# Patient Record
Sex: FEMALE | Race: BLACK OR AFRICAN AMERICAN | NOT HISPANIC OR LATINO | Employment: FULL TIME | URBAN - METROPOLITAN AREA
[De-identification: names, ages, dates, MRNs, and addresses within clinical notes are randomized per-mention and may not be internally consistent; named-entity substitution may affect disease eponyms.]

---

## 2017-10-21 ENCOUNTER — APPOINTMENT (OUTPATIENT)
Dept: GENERAL RADIOLOGY | Facility: CLINIC | Age: 57
End: 2017-10-21
Attending: EMERGENCY MEDICINE
Payer: COMMERCIAL

## 2017-10-21 ENCOUNTER — HOSPITAL ENCOUNTER (EMERGENCY)
Facility: CLINIC | Age: 57
Discharge: HOME | End: 2017-10-21
Attending: EMERGENCY MEDICINE | Admitting: EMERGENCY MEDICINE
Payer: COMMERCIAL

## 2017-10-21 VITALS
HEIGHT: 61 IN | WEIGHT: 148 LBS | BODY MASS INDEX: 27.94 KG/M2 | SYSTOLIC BLOOD PRESSURE: 153 MMHG | DIASTOLIC BLOOD PRESSURE: 112 MMHG | HEART RATE: 89 BPM | OXYGEN SATURATION: 98 % | TEMPERATURE: 99.1 F | RESPIRATION RATE: 16 BRPM

## 2017-10-21 DIAGNOSIS — T59.811A SMOKE INHALATION: ICD-10-CM

## 2017-10-21 LAB
ANION GAP SERPL CALCULATED.3IONS-SCNC: 7 MMOL/L (ref 3–14)
BASE EXCESS BLDV CALC-SCNC: 1.8 MMOL/L
BASOPHILS # BLD AUTO: 0 10E9/L (ref 0–0.2)
BASOPHILS NFR BLD AUTO: 0.5 %
BUN SERPL-MCNC: 21 MG/DL (ref 7–30)
CALCIUM SERPL-MCNC: 9.2 MG/DL (ref 8.5–10.1)
CHLORIDE SERPL-SCNC: 106 MMOL/L (ref 94–109)
CO2 SERPL-SCNC: 26 MMOL/L (ref 20–32)
COHGB MFR BLD: 1.5 % (ref 0–2)
CREAT SERPL-MCNC: 0.6 MG/DL (ref 0.52–1.04)
DIFFERENTIAL METHOD BLD: ABNORMAL
EOSINOPHIL # BLD AUTO: 0.1 10E9/L (ref 0–0.7)
EOSINOPHIL NFR BLD AUTO: 1.7 %
ERYTHROCYTE [DISTWIDTH] IN BLOOD BY AUTOMATED COUNT: 15.6 % (ref 10–15)
GFR SERPL CREATININE-BSD FRML MDRD: >90 ML/MIN/1.7M2
GLUCOSE SERPL-MCNC: 96 MG/DL (ref 70–99)
HCO3 BLDV-SCNC: 27 MMOL/L (ref 21–28)
HCT VFR BLD AUTO: 37.3 % (ref 35–47)
HGB BLD-MCNC: 12.2 G/DL (ref 11.7–15.7)
IMM GRANULOCYTES # BLD: 0 10E9/L (ref 0–0.4)
IMM GRANULOCYTES NFR BLD: 0 %
INTERPRETATION ECG - MUSE: NORMAL
LYMPHOCYTES # BLD AUTO: 1.6 10E9/L (ref 0.8–5.3)
LYMPHOCYTES NFR BLD AUTO: 38.2 %
MCH RBC QN AUTO: 28.6 PG (ref 26.5–33)
MCHC RBC AUTO-ENTMCNC: 32.7 G/DL (ref 31.5–36.5)
MCV RBC AUTO: 88 FL (ref 78–100)
MONOCYTES # BLD AUTO: 0.5 10E9/L (ref 0–1.3)
MONOCYTES NFR BLD AUTO: 12.8 %
NEUTROPHILS # BLD AUTO: 1.9 10E9/L (ref 1.6–8.3)
NEUTROPHILS NFR BLD AUTO: 46.8 %
NRBC # BLD AUTO: 0 10*3/UL
NRBC BLD AUTO-RTO: 0 /100
OXYHGB MFR BLDV: 82 %
PCO2 BLDV: 43 MM HG (ref 40–50)
PH BLDV: 7.4 PH (ref 7.32–7.43)
PLATELET # BLD AUTO: 205 10E9/L (ref 150–450)
PO2 BLDV: 48 MM HG (ref 25–47)
POTASSIUM SERPL-SCNC: 3.3 MMOL/L (ref 3.4–5.3)
RBC # BLD AUTO: 4.26 10E12/L (ref 3.8–5.2)
SODIUM SERPL-SCNC: 139 MMOL/L (ref 133–144)
WBC # BLD AUTO: 4.1 10E9/L (ref 4–11)

## 2017-10-21 PROCEDURE — 80048 BASIC METABOLIC PNL TOTAL CA: CPT | Performed by: EMERGENCY MEDICINE

## 2017-10-21 PROCEDURE — 85025 COMPLETE CBC W/AUTO DIFF WBC: CPT | Performed by: EMERGENCY MEDICINE

## 2017-10-21 PROCEDURE — 93005 ELECTROCARDIOGRAM TRACING: CPT

## 2017-10-21 PROCEDURE — 82375 ASSAY CARBOXYHB QUANT: CPT | Performed by: EMERGENCY MEDICINE

## 2017-10-21 PROCEDURE — 71020 XR CHEST 2 VW: CPT

## 2017-10-21 PROCEDURE — 99285 EMERGENCY DEPT VISIT HI MDM: CPT | Mod: 25

## 2017-10-21 PROCEDURE — 82805 BLOOD GASES W/O2 SATURATION: CPT | Performed by: EMERGENCY MEDICINE

## 2017-10-21 RX ORDER — MORPHINE SULFATE 2 MG/ML
4 INJECTION, SOLUTION INTRAMUSCULAR; INTRAVENOUS
Status: DISCONTINUED | OUTPATIENT
Start: 2017-10-21 | End: 2017-10-21 | Stop reason: HOSPADM

## 2017-10-21 RX ORDER — ACETAMINOPHEN AND CODEINE PHOSPHATE 300; 30 MG/1; MG/1
1-2 TABLET ORAL EVERY 6 HOURS PRN
Qty: 20 TABLET | Refills: 0 | Status: ON HOLD | OUTPATIENT
Start: 2017-10-21 | End: 2018-11-06

## 2017-10-21 RX ORDER — ALBUTEROL SULFATE 90 UG/1
1-2 AEROSOL, METERED RESPIRATORY (INHALATION) EVERY 6 HOURS PRN
Qty: 1 INHALER | Refills: 0 | Status: SHIPPED | OUTPATIENT
Start: 2017-10-21

## 2017-10-21 ASSESSMENT — ENCOUNTER SYMPTOMS
SHORTNESS OF BREATH: 1
TROUBLE SWALLOWING: 1
ABDOMINAL PAIN: 0
COUGH: 1
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
## 2017-10-21 NOTE — ED AVS SNAPSHOT
Emergency Department    [address] [place] [address]    Phone:  [phone]    Fax:  [phone]                                       Esmer Churchill   MRN: [id number]    Department:   Emergency Department   Date of Visit:  10/21/2017           After Visit Summary Signature Page     I have received my discharge instructions, and my questions have been answered. I have discussed any challenges I see with this plan with the nurse or doctor.    ..........................................................................................................................................  Patient/Patient Representative Signature      ..........................................................................................................................................  Patient Representative Print Name and Relationship to Patient    ..................................................               ................................................  Date                                            Time    ..........................................................................................................................................  Reviewed by Signature/Title    ...................................................              ..............................................  Date                                                            Time
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
## 2018-11-05 ENCOUNTER — APPOINTMENT (OUTPATIENT)
Dept: GENERAL RADIOLOGY | Facility: CLINIC | Age: 58
End: 2018-11-05
Attending: EMERGENCY MEDICINE
Payer: COMMERCIAL

## 2018-11-05 ENCOUNTER — HOSPITAL ENCOUNTER (OUTPATIENT)
Facility: CLINIC | Age: 58
Setting detail: OBSERVATION
Discharge: HOME | End: 2018-11-06
Attending: EMERGENCY MEDICINE | Admitting: HOSPITALIST
Payer: COMMERCIAL

## 2018-11-05 DIAGNOSIS — R07.9 CHEST PAIN: ICD-10-CM

## 2018-11-05 DIAGNOSIS — K21.9 GASTROESOPHAGEAL REFLUX DISEASE, ESOPHAGITIS PRESENCE NOT SPECIFIED: Primary | ICD-10-CM

## 2018-11-05 LAB
ANION GAP SERPL CALCULATED.3IONS-SCNC: 4 MMOL/L (ref 3–14)
BASOPHILS # BLD AUTO: 0 10E9/L (ref 0–0.2)
BASOPHILS NFR BLD AUTO: 0.5 %
BUN SERPL-MCNC: 21 MG/DL (ref 7–30)
CALCIUM SERPL-MCNC: 9.4 MG/DL (ref 8.5–10.1)
CHLORIDE SERPL-SCNC: 108 MMOL/L (ref 94–109)
CO2 SERPL-SCNC: 28 MMOL/L (ref 20–32)
CREAT SERPL-MCNC: 0.62 MG/DL (ref 0.52–1.04)
D DIMER PPP FEU-MCNC: <0.3 UG/ML FEU (ref 0–0.5)
DIFFERENTIAL METHOD BLD: ABNORMAL
EOSINOPHIL # BLD AUTO: 0.1 10E9/L (ref 0–0.7)
EOSINOPHIL NFR BLD AUTO: 2 %
ERYTHROCYTE [DISTWIDTH] IN BLOOD BY AUTOMATED COUNT: 15.8 % (ref 10–15)
GFR SERPL CREATININE-BSD FRML MDRD: >90 ML/MIN/1.7M2
GLUCOSE SERPL-MCNC: 73 MG/DL (ref 70–99)
HCT VFR BLD AUTO: 40.9 % (ref 35–47)
HGB BLD-MCNC: 13.4 G/DL (ref 11.7–15.7)
IMM GRANULOCYTES # BLD: 0 10E9/L (ref 0–0.4)
IMM GRANULOCYTES NFR BLD: 0.3 %
INTERPRETATION ECG - MUSE: NORMAL
LYMPHOCYTES # BLD AUTO: 2.1 10E9/L (ref 0.8–5.3)
LYMPHOCYTES NFR BLD AUTO: 52.8 %
MCH RBC QN AUTO: 29 PG (ref 26.5–33)
MCHC RBC AUTO-ENTMCNC: 32.8 G/DL (ref 31.5–36.5)
MCV RBC AUTO: 89 FL (ref 78–100)
MONOCYTES # BLD AUTO: 0.4 10E9/L (ref 0–1.3)
MONOCYTES NFR BLD AUTO: 10.8 %
NEUTROPHILS # BLD AUTO: 1.3 10E9/L (ref 1.6–8.3)
NEUTROPHILS NFR BLD AUTO: 33.6 %
NRBC # BLD AUTO: 0 10*3/UL
NRBC BLD AUTO-RTO: 0 /100
PLATELET # BLD AUTO: 223 10E9/L (ref 150–450)
POTASSIUM SERPL-SCNC: 3.9 MMOL/L (ref 3.4–5.3)
RBC # BLD AUTO: 4.62 10E12/L (ref 3.8–5.2)
SODIUM SERPL-SCNC: 140 MMOL/L (ref 133–144)
TROPONIN I SERPL-MCNC: <0.015 UG/L (ref 0–0.04)
WBC # BLD AUTO: 4 10E9/L (ref 4–11)

## 2018-11-05 PROCEDURE — 25000125 ZZHC RX 250: Performed by: EMERGENCY MEDICINE

## 2018-11-05 PROCEDURE — 85379 FIBRIN DEGRADATION QUANT: CPT | Performed by: EMERGENCY MEDICINE

## 2018-11-05 PROCEDURE — 93005 ELECTROCARDIOGRAM TRACING: CPT

## 2018-11-05 PROCEDURE — 99217 ZZC OBSERVATION CARE DISCHARGE: CPT | Performed by: PHYSICIAN ASSISTANT

## 2018-11-05 PROCEDURE — 83036 HEMOGLOBIN GLYCOSYLATED A1C: CPT | Performed by: EMERGENCY MEDICINE

## 2018-11-05 PROCEDURE — 99285 EMERGENCY DEPT VISIT HI MDM: CPT | Mod: 25

## 2018-11-05 PROCEDURE — 71046 X-RAY EXAM CHEST 2 VIEWS: CPT

## 2018-11-05 PROCEDURE — 84484 ASSAY OF TROPONIN QUANT: CPT | Performed by: EMERGENCY MEDICINE

## 2018-11-05 PROCEDURE — 99220 ZZC INITIAL OBSERVATION CARE,LEVL III: CPT | Performed by: HOSPITALIST

## 2018-11-05 PROCEDURE — 85025 COMPLETE CBC W/AUTO DIFF WBC: CPT | Performed by: EMERGENCY MEDICINE

## 2018-11-05 PROCEDURE — 25000132 ZZH RX MED GY IP 250 OP 250 PS 637: Performed by: EMERGENCY MEDICINE

## 2018-11-05 PROCEDURE — 80048 BASIC METABOLIC PNL TOTAL CA: CPT | Performed by: EMERGENCY MEDICINE

## 2018-11-05 RX ORDER — NITROGLYCERIN 0.4 MG/1
0.4 TABLET SUBLINGUAL EVERY 5 MIN PRN
Status: COMPLETED | OUTPATIENT
Start: 2018-11-05 | End: 2018-11-05

## 2018-11-05 RX ORDER — ASPIRIN 81 MG/1
324 TABLET, CHEWABLE ORAL ONCE
Status: COMPLETED | OUTPATIENT
Start: 2018-11-05 | End: 2018-11-05

## 2018-11-05 RX ADMIN — NITROGLYCERIN 0.4 MG: 0.4 TABLET SUBLINGUAL at 21:04

## 2018-11-05 RX ADMIN — NITROGLYCERIN 0.4 MG: 0.4 TABLET SUBLINGUAL at 23:10

## 2018-11-05 RX ADMIN — LIDOCAINE HYDROCHLORIDE 30 ML: 20 SOLUTION ORAL; TOPICAL at 21:36

## 2018-11-05 RX ADMIN — NITROGLYCERIN 0.4 MG: 0.4 TABLET SUBLINGUAL at 23:17

## 2018-11-05 RX ADMIN — ASPIRIN 81 MG 324 MG: 81 TABLET ORAL at 21:03

## 2018-11-05 ASSESSMENT — ENCOUNTER SYMPTOMS
NAUSEA: 0
SHORTNESS OF BREATH: 1
FEVER: 0
DIAPHORESIS: 0
COUGH: 0
VOMITING: 0

## 2018-11-05 NOTE — IP AVS SNAPSHOT
[facility]    [address] [place] [address]    Phone:  [phone]                                       After Visit Summary   11/5/2018    Esmer Churchill    MRN: [id number]           After Visit Summary Signature Page     I have received my discharge instructions, and my questions have been answered. I have discussed any challenges I see with this plan with the nurse or doctor.    ..........................................................................................................................................  Patient/Patient Representative Signature      ..........................................................................................................................................  Patient Representative Print Name and Relationship to Patient    ..................................................               ................................................  Date                                   Time    ..........................................................................................................................................  Reviewed by Signature/Title    ...................................................              ..............................................  Date                                               Time          22EPIC Rev 08/18

## 2018-11-05 NOTE — IP AVS SNAPSHOT
MRN:[id number]                      After Visit Summary   11/5/2018    Esmer Churchill    MRN: [id number]           Thank you!     Thank you for choosing [facility] for your care. Our goal is always to provide you with excellent care. Hearing back from our patients is one way we can continue to improve our services. Please take a few minutes to complete the written survey that you may receive in the mail after you visit with us. Thank you!        Patient Information     Date Of Birth          1960        About your hospital stay     You were admitted on:  November 6, 2018 [facility]    You were discharged on:  November 6, 2018        Reason for your hospital stay       Observation hospitalization for evaluation of chest pain.  Your cardiac work up including your stress test was normal.                  Who to Call     For medical emergencies, please call 911.  For non-urgent questions about your medical care, please call your primary care provider or clinic, [phone]          Attending Provider     Provider Specialty    Tabatha White MD Emergency Medicine    [facility]       Primary Care Provider Office Phone # Fax #    [facility] [phone]      After Care Instructions     Activity       Your activity upon discharge: activity as tolerated.            Diet       Follow this diet upon discharge: Regular, Avoid foods with high acidity or that are spicy.  Limit caffeine and coffee intake.                  Follow-up Appointments     Follow-up and recommended labs and tests        Follow up with primary care provider within 7 days for hospital follow- up and for further workup of non-cardiac causes of chest pain.  Consider gastroenterology referral if pain persists.                  Pending Results     No orders found for last 3 day(s).            Statement of Approval      "Ordered          18 1055  I have reviewed and agree with all the recommendations and orders detailed in this document.  EFFECTIVE NOW     Approved and electronically signed by:  Terrance Lopez PA             Admission Information     Date & Time Provider Department Dept. Phone    2018 Ramirez Arevalo MD [facility] Observation Unit [phone]      Your Vitals Were     Blood Pressure Pulse Temperature Respirations Height Weight    99/63 (BP Location: Left arm) 75 96.5  F (35.8  C) (Oral) 14 1.549 m (5' 1\") 72.7 kg (160 lb 3.2 oz)    Pulse Oximetry BMI (Body Mass Index)                99% 30.27 kg/m2          MyChart Information     [email or web address] lets you send messages to your doctor, view your test results, renew your prescriptions, schedule appointments and more. To sign up, go to [email or web address] . Click on \"Log in\" on the left side of the screen, which will take you to the Welcome page. Then click on \"Sign up Now\" on the right side of the page.     You will be asked to enter the access code listed below, as well as some personal information. Please follow the directions to create your username and password.     Your access code is: [email or web address]  Expires: 2019 10:57 AM     Your access code will  in 90 days. If you need help or a new code, please call your [facility] clinic or [phone].        Care EveryWhere ID     This is your Care EveryWhere ID. This could be used by other organizations to access your [facility] medical records  [id number]        Equal Access to Services     [facility]: Hadii benedict darnell Somilad, waaxda luqadaha, qaybta kaalmalala bernal . So [facility] [phone].    ATENCIÓN: Si habla español, tiene a norman disposición servicios gratuitos de asistencia lingüística. Llame al [phone].    We comply with applicable federal civil rights laws and [place] laws. We do not discriminate on the basis of race, color, " national origin, age, disability, sex, sexual orientation, or gender identity.               Review of your medicines      START taking        Dose / Directions    calcium carbonate 500 MG chewable tablet   Commonly known as:  TUMS   Used for:  Gastroesophageal reflux disease, esophagitis presence not specified        Dose:  1-2 chew tab   Take 1-2 tablets (500-1,000 mg) by mouth 3 times daily as needed for heartburn   Refills:  0       ranitidine 75 MG tablet   Commonly known as:  ZANTAC   Used for:  Gastroesophageal reflux disease, esophagitis presence not specified        Dose:  150 mg   Take 2 tablets (150 mg) by mouth 2 times daily as needed for heartburn   Refills:  0         CONTINUE these medicines which may have CHANGED, or have new prescriptions. If we are uncertain of the size of tablets/capsules you have at home, strength may be listed as something that might have changed.        Dose / Directions    omeprazole 20 MG CR capsule   Commonly known as:  priLOSEC   This may have changed:    - how much to take  - when to take this   Used for:  Gastroesophageal reflux disease, esophagitis presence not specified        Dose:  40 mg   Take 2 capsules (40 mg) by mouth   Refills:  0         CONTINUE these medicines which have NOT CHANGED        Dose / Directions    albuterol 108 (90 Base) MCG/ACT inhaler   Commonly known as:  PROAIR HFA/PROVENTIL HFA/VENTOLIN HFA        Dose:  1-2 puff   Inhale 1-2 puffs into the lungs every 6 hours as needed for shortness of breath / dyspnea or wheezing   Quantity:  1 Inhaler   Refills:  0       hydrochlorothiazide 12.5 MG Tabs tablet        Dose:  12.5 mg   Take 12.5 mg by mouth daily   Refills:  0       lisinopril 40 MG tablet   Commonly known as:  PRINIVIL/ZESTRIL        Dose:  40 mg   Take 40 mg by mouth daily   Refills:  0       multivitamin, therapeutic with minerals Tabs tablet        Dose:  1 tablet   Take 1 tablet by mouth daily   Refills:  0       order for DME   Used  for:  Leg weakness, bilateral        Equipment being ordered: Walker Wheels () and Walker () Treatment Diagnosis: Impaired gait and balance   Quantity:  1 each   Refills:  0       oxybutynin chloride 15 MG Tb24        Dose:  15 mg   Take 15 mg by mouth daily   Refills:  0         STOP taking     ibuprofen 800 MG tablet   Commonly known as:  ADVIL/MOTRIN                Where to get your medicines      Some of these will need a paper prescription and others can be bought over the counter. Ask your nurse if you have questions.     You don't need a prescription for these medications     calcium carbonate 500 MG chewable tablet    ranitidine 75 MG tablet                Protect others around you: Learn how to safely use, store and throw away your medicines at www.disposemymeds.org.             Medication List: This is a list of all your medications and when to take them. Check marks below indicate your daily home schedule. Keep this list as a reference.      Medications           Morning Afternoon Evening Bedtime As Needed    albuterol 108 (90 Base) MCG/ACT inhaler   Commonly known as:  PROAIR HFA/PROVENTIL HFA/VENTOLIN HFA   Inhale 1-2 puffs into the lungs every 6 hours as needed for shortness of breath / dyspnea or wheezing                                calcium carbonate 500 MG chewable tablet   Commonly known as:  TUMS   Take 1-2 tablets (500-1,000 mg) by mouth 3 times daily as needed for heartburn                                hydrochlorothiazide 12.5 MG Tabs tablet   Take 12.5 mg by mouth daily                                lisinopril 40 MG tablet   Commonly known as:  PRINIVIL/ZESTRIL   Take 40 mg by mouth daily                                multivitamin, therapeutic with minerals Tabs tablet   Take 1 tablet by mouth daily                                omeprazole 20 MG CR capsule   Commonly known as:  priLOSEC   Take 2 capsules (40 mg) by mouth                                order for DME   Equipment  being ordered: Walker Wheels () and Walker () Treatment Diagnosis: Impaired gait and balance                                oxybutynin chloride 15 MG Tb24   Take 15 mg by mouth daily                                ranitidine 75 MG tablet   Commonly known as:  ZANTAC   Take 2 tablets (150 mg) by mouth 2 times daily as needed for heartburn                                          More Information        Medicines for GERD  Gastroesophageal reflux disease (GERD) can be treated with medicine. This may be done with a medicine you can buy over the counter. Or it may be done with a medicine that your healthcare provider has to prescribe. In some cases, both types may be used. Your provider will tell you what is best for your symptoms.  Antacids  Antacids work to weaken the acid in your stomach and can give you quick relief. You can buy many of them with no prescription. Antacids can be high in sodium. This may be a problem if you have high blood pressure. Some antacids also have aluminum. This should be avoided if you have long-term (chronic) kidney disease. So check with your provider first. Take antacids only when you need to, as advised by your provider.  Side effects: Constipation, diarrhea. If you take too much medicine, it can cause calcium to build up.   H-2 blockers  H-2 blockers cause the stomach to make less acid. They are often used both on demand as symptoms occur, and daily to keep symptoms away. Your provider may prescribe them if antacids don t work for you. You can buy some of them over the counter. These come in a lower dosage.  Side effects: Confusion in older adults  Proton-pump inhibitors  These also cause the stomach to make less acid. They reduce stomach acid more than H-2 blockers. They may be used for a short time, or longer to treat certain conditions. You can buy some of them over the counter. Or your provider may prescribe them. They help control GERD symptoms.  Side effects: Belly  (abdominal) pain, diarrhea, upset stomach (nausea). Possible other side effects linked to long-term use and high doses.  Prokinetics  These medicines affect the movement of the digestive tract. They may be recommended if your stomach is emptying too slowly. But in most cases they are not recommended for treating GERD.   Side effects: Tiredness, depression, anxiety, problems with physical movement, belly cramps, constipation, diarrhea, a jittery feeling  Medicines to avoid  Don t take aspirin without your provider s approval. And don t take a nonsteroidal anti-inflammatory drug (NSAID), such as ibuprofen. These reduce the protective lining of your stomach. This can lead to more GERD symptoms. Check with your provider or pharmacist before taking a new medicine.   Date Last Reviewed: 7/1/2016 2000-2018 [employer]. [address] [place] [address], [place] [address]. All rights reserved. This information is not intended as a substitute for professional medical care. Always follow your healthcare professional's instructions.                Lifestyle Changes for Controlling GERD  When you have GERD, stomach acid feels as if it s backing up toward your mouth. Whether or not you take medicine to control your GERD, your symptoms can often be improved with lifestyle changes. Talk to your healthcare provider about the following suggestions. These suggestions may help you get relief from your symptoms.      Raise your head  Reflux is more likely to strike when you re lying down flat, because stomach fluid can flow backward more easily. Raising the head of your bed 4 to 6 inches can help. To do this:    Slide blocks or books under the legs at the head of your bed. Or, place a wedge under the mattress. [employer] can make a suitable wedge for you. The wedge should run from your waist to the top of your head.    Don t just prop your head on several pillows. This increases pressure on your stomach. It can make GERD  worse.  Watch your eating habits  Certain foods may increase the acid in your stomach or relax the lower esophageal sphincter. This makes GERD more likely. It s best to avoid the following if they cause you symptoms:    Coffee, tea, and carbonated drinks (with and without caffeine)    Fatty, fried, or spicy food    Mint, chocolate, onions, and tomatoes    Peppermint    Any other foods that seem to irritate your stomach or cause you pain  Relieve the pressure  Tips include the following:    Eat smaller meals, even if you have to eat more often.    Don t lie down right after you eat. Wait a few hours for your stomach to empty.    Avoid tight belts and tight-fitting clothes.    Lose excess weight.  Tobacco and alcohol  Avoid smoking tobacco and drinking alcohol. They can make GERD symptoms worse.  Date Last Reviewed: 7/1/2016 2000-2018 [employer] [address] [place] [address]. All rights reserved. This information is not intended as a substitute for professional medical care. Always follow your healthcare professional's instructions.                GERD (Adult)    The esophagus is a tube that carries food from the mouth to the stomach. A valve (the LES, lower esophageal sphincter) at the lower end of the esophagus prevents stomach acid from flowing upward. When this valve doesn't work properly, stomach contents may repeatedly flow back up (reflux) into the esophagus. This is called gastroesophageal reflux disease (GERD). GERD can irritate the esophagus. It can cause problems with pain, swallowing or breathing. In severe cases, GERD can cause recurrent pneumonia (from aspiration or breathing in particles) or other serious problems.  Symptoms of reflux include burning, pressure or sharp pain in the upper abdomen or mid to lower chest. The pain can spread to the neck, back, or shoulder. There may be belching, an acid taste in the back of the throat, chronic cough, or sore throat, or  "hoarseness. GERD symptoms often occur during the day after a big meal. They can also occur at night when lying down.   Home care  Lifestyle changes can help reduce symptoms. If needed, your healthcare provider may prescribe medicines. Symptoms often improve with treatment, but if treatment is stopped, the symptoms often return after a few months. So most persons with GERD will need to continue treatment or get treatment on and off.  Lifestyle changes    Limit or avoid fatty, fried, and spicy foods, as well as coffee, chocolate, mint, and foods with high acid content such as tomatoes and citrus fruit and juices (orange, grapefruit, lemon).    Don t eat large meals, especially at night. Frequent, smaller meals are best. Don't lie down right after eating. And don t eat anything 3 hours before going to bed.    Don't drink alcohol or smoke. As much as possible, stay away from second hand smoke.    If you are overweight, losing weight will reduce symptoms.     Don't wear tight clothing around your stomach area.    If your symptoms occur during sleep, use a foam wedge to elevate your upper body (not just your head.) Or, place 4\" blocks under the head of your bed. Or use 2 bed risers under your bedframe.  Medicines  If needed, medicines can help relieve the symptoms of GERD and prevent damage to the esophagus. Discuss a medicine plan with your healthcare provider. This may include one or more of the following medicines:    Antacids to help neutralize the normal acids in your stomach.    Acid blockers (Histamine or H2 blockers) to decrease acid production.    Acid inhibitors (proton pump inhibitors PPIs) to decrease acid production in a different way than the blockers. They may work better, but can take a little longer to take effect.  Take an antacid 30 to 60 minutes after eating and at bedtime, but not at the same time as an acid blocker.  Try not to take medicines such as ibuprofen and aspirin. If you are taking aspirin " for your heart or other medical reasons, talk to your healthcare provider about stopping it.  Follow-up care  Follow up with your healthcare provider or as advised by our staff.  When to seek medical advice  Call your healthcare provider if any of the following occur:    Stomach pain gets worse or moves to the lower right abdomen (appendix area)    Chest pain appears or gets worse, or spreads to the back, neck, shoulder, or arm    An over-the-counter trial of medicine doesn't relieve your symptoms    Weight loss that can't be explained    Trouble or pain swallowing    Frequent vomiting (can t keep down liquids)    Blood in the stool or vomit (red or black in color)    Feeling weak or dizzy    Fever of 100.4 F (38 C) or higher, or as directed by your healthcare provider  Date Last Reviewed: 3/1/2018    8751-7481 [employer]. [address] [place] [address], [place] [address]. All rights reserved. This information is not intended as a substitute for professional medical care. Always follow your healthcare professional's instructions.

## 2018-11-06 ENCOUNTER — APPOINTMENT (OUTPATIENT)
Dept: CARDIOLOGY | Facility: CLINIC | Age: 58
End: 2018-11-06
Attending: HOSPITALIST
Payer: COMMERCIAL

## 2018-11-06 VITALS
WEIGHT: 160.2 LBS | SYSTOLIC BLOOD PRESSURE: 99 MMHG | TEMPERATURE: 96.5 F | HEART RATE: 75 BPM | BODY MASS INDEX: 30.25 KG/M2 | OXYGEN SATURATION: 99 % | HEIGHT: 61 IN | RESPIRATION RATE: 14 BRPM | DIASTOLIC BLOOD PRESSURE: 63 MMHG

## 2018-11-06 LAB
GLUCOSE BLDC GLUCOMTR-MCNC: 76 MG/DL (ref 70–99)
GLUCOSE BLDC GLUCOMTR-MCNC: 84 MG/DL (ref 70–99)
GLUCOSE BLDC GLUCOMTR-MCNC: 99 MG/DL (ref 70–99)
HBA1C MFR BLD: 6 % (ref 0–5.6)
HBA1C MFR BLD: 6 % (ref 0–5.6)
TROPONIN I SERPL-MCNC: <0.015 UG/L (ref 0–0.04)

## 2018-11-06 PROCEDURE — 93325 DOPPLER ECHO COLOR FLOW MAPG: CPT | Mod: 26 | Performed by: INTERNAL MEDICINE

## 2018-11-06 PROCEDURE — 93321 DOPPLER ECHO F-UP/LMTD STD: CPT | Mod: 26 | Performed by: INTERNAL MEDICINE

## 2018-11-06 PROCEDURE — 93325 DOPPLER ECHO COLOR FLOW MAPG: CPT | Mod: TC

## 2018-11-06 PROCEDURE — 25500064 ZZH RX 255 OP 636: Performed by: HOSPITALIST

## 2018-11-06 PROCEDURE — 36415 COLL VENOUS BLD VENIPUNCTURE: CPT | Performed by: HOSPITALIST

## 2018-11-06 PROCEDURE — 84484 ASSAY OF TROPONIN QUANT: CPT | Performed by: HOSPITALIST

## 2018-11-06 PROCEDURE — 00000146 ZZHCL STATISTIC GLUCOSE BY METER IP

## 2018-11-06 PROCEDURE — 93018 CV STRESS TEST I&R ONLY: CPT | Performed by: INTERNAL MEDICINE

## 2018-11-06 PROCEDURE — G0378 HOSPITAL OBSERVATION PER HR: HCPCS

## 2018-11-06 PROCEDURE — 93016 CV STRESS TEST SUPVJ ONLY: CPT | Performed by: INTERNAL MEDICINE

## 2018-11-06 PROCEDURE — 93350 STRESS TTE ONLY: CPT | Mod: 26 | Performed by: INTERNAL MEDICINE

## 2018-11-06 PROCEDURE — 25000132 ZZH RX MED GY IP 250 OP 250 PS 637: Performed by: HOSPITALIST

## 2018-11-06 PROCEDURE — 83036 HEMOGLOBIN GLYCOSYLATED A1C: CPT | Performed by: HOSPITALIST

## 2018-11-06 RX ORDER — ACETAMINOPHEN 325 MG/1
650 TABLET ORAL EVERY 4 HOURS PRN
Status: DISCONTINUED | OUTPATIENT
Start: 2018-11-06 | End: 2018-11-06 | Stop reason: HOSPADM

## 2018-11-06 RX ORDER — CALCIUM CARBONATE 500 MG/1
1-2 TABLET, CHEWABLE ORAL 3 TIMES DAILY PRN
COMMUNITY
Start: 2018-11-06

## 2018-11-06 RX ORDER — NITROGLYCERIN 0.4 MG/1
0.4 TABLET SUBLINGUAL EVERY 5 MIN PRN
Status: DISCONTINUED | OUTPATIENT
Start: 2018-11-06 | End: 2018-11-06 | Stop reason: HOSPADM

## 2018-11-06 RX ORDER — ASPIRIN 81 MG/1
81 TABLET ORAL DAILY
Status: DISCONTINUED | OUTPATIENT
Start: 2018-11-06 | End: 2018-11-06 | Stop reason: HOSPADM

## 2018-11-06 RX ORDER — IBUPROFEN 800 MG/1
800 TABLET, FILM COATED ORAL EVERY 8 HOURS PRN
Status: ON HOLD | COMMUNITY
End: 2018-11-06

## 2018-11-06 RX ORDER — ACETAMINOPHEN 650 MG/1
650 SUPPOSITORY RECTAL EVERY 4 HOURS PRN
Status: DISCONTINUED | OUTPATIENT
Start: 2018-11-06 | End: 2018-11-06 | Stop reason: HOSPADM

## 2018-11-06 RX ORDER — ASPIRIN 81 MG/1
162 TABLET, CHEWABLE ORAL ONCE
Status: DISCONTINUED | OUTPATIENT
Start: 2018-11-06 | End: 2018-11-06 | Stop reason: HOSPADM

## 2018-11-06 RX ORDER — LISINOPRIL 40 MG/1
40 TABLET ORAL DAILY
COMMUNITY

## 2018-11-06 RX ORDER — NICOTINE POLACRILEX 4 MG
15-30 LOZENGE BUCCAL
Status: DISCONTINUED | OUTPATIENT
Start: 2018-11-06 | End: 2018-11-06 | Stop reason: HOSPADM

## 2018-11-06 RX ORDER — DEXTROSE MONOHYDRATE 25 G/50ML
25-50 INJECTION, SOLUTION INTRAVENOUS
Status: DISCONTINUED | OUTPATIENT
Start: 2018-11-06 | End: 2018-11-06 | Stop reason: HOSPADM

## 2018-11-06 RX ORDER — ALUMINA, MAGNESIA, AND SIMETHICONE 2400; 2400; 240 MG/30ML; MG/30ML; MG/30ML
30 SUSPENSION ORAL EVERY 4 HOURS PRN
Status: DISCONTINUED | OUTPATIENT
Start: 2018-11-06 | End: 2018-11-06 | Stop reason: HOSPADM

## 2018-11-06 RX ORDER — NALOXONE HYDROCHLORIDE 0.4 MG/ML
.1-.4 INJECTION, SOLUTION INTRAMUSCULAR; INTRAVENOUS; SUBCUTANEOUS
Status: DISCONTINUED | OUTPATIENT
Start: 2018-11-06 | End: 2018-11-06 | Stop reason: HOSPADM

## 2018-11-06 RX ORDER — HYDROCHLOROTHIAZIDE 12.5 MG/1
12.5 TABLET ORAL DAILY
COMMUNITY

## 2018-11-06 RX ORDER — LIDOCAINE 40 MG/G
CREAM TOPICAL
Status: DISCONTINUED | OUTPATIENT
Start: 2018-11-06 | End: 2018-11-06 | Stop reason: HOSPADM

## 2018-11-06 RX ADMIN — ACETAMINOPHEN 650 MG: 325 TABLET, FILM COATED ORAL at 01:49

## 2018-11-06 RX ADMIN — ASPIRIN 81 MG: 81 TABLET, COATED ORAL at 07:45

## 2018-11-06 RX ADMIN — HUMAN ALBUMIN MICROSPHERES AND PERFLUTREN 9 ML: 10; .22 INJECTION, SOLUTION INTRAVENOUS at 08:28

## 2018-11-06 NOTE — H&P
[facility]    History and Physical  Hospitalist       Date of Admission:  11/5/2018  Date of Service (when I saw the patient): 11/05/18    ASSESSMENT  Esmer Churchill is a markedly pleasant 58 year old woman with hypertension, impaired glucose tolerance, GERD, and spinal stenosis who presents with chest pain.    PLAN    1) Chest pain: Atypical for ACS but we will rule it out. Could be due to recurrent GERD or costochondritis given recent cough and sneezing.    -- Observation. Telemetry, serial enzymes, Exercise stress echo ordered. If results are reassuring, she can be discharged with close follow up. Will try prn GI cocktails and Tylenol for her pain. If results are concerning we will consult Cardiology for further evaluation.    2) Impaired glucose tolerance:    -- A1c today is 6. ISS insulin while hospitailzed; close outpatient follow up advised.    3) Hypertension: Resume home medications at discharge    4) Prior stroke: By history; she was hospitalized here for bilateral lower extremity weakness in 2016 with negative MRI/MRA testing.    Chief Complaint   Chest pain    History is obtained from the patient and the ED physician whom I have spoken with    History of Present Illness   Esmer Churchill is a markedly pleasant 58 year old woman who presents with sharp substernal chest pressure that she first noticed last week while swimming; it does not radiate and is associated with dizziness and blurry vision. It resolved with rest, recurred again at the gym the next day, and again resolved with rest; however today while not exerting herself she noticed its recurrence around  PM; this time it lasted for several hours before resolving after she received 3 nitroglycerin tablets in the ED. She denies nausea or diaphoresis with it. She has had prior chest pain due to heartburn and tried Prilosec tablets without relief today. She has had a mild non productive cough for the past couple of days. She denies  fever, orthopnea, leg swelling, PND, abdominal complaints, or any other acute complaints. She went first to an Urgent care before being sent into our ED.    In the ED, Blood pressure 118/72, pulse 75, temperature 97.3  F (36.3  C), resp. rate 11, SpO2 99 %.    CBC, BMP, D-Dimer, Troponin, and CXR were unremarkable and EKG showed NSR without ST segment changes.     Data   Labs Ordered and Resulted from Time of ED Arrival Up to the Time of Departure from the ED   CBC WITH PLATELETS DIFFERENTIAL - Abnormal; Notable for the following:        Result Value    RDW 15.8 (*)     Absolute Neutrophil 1.3 (*)     All other components within normal limits   D DIMER QUANTITATIVE   BASIC METABOLIC PANEL   TROPONIN I   PERIPHERAL IV CATHETER   CARDIAC CONTINUOUS MONITORING   PULSE OXIMETRY NURSING       PHYSICAL EXAM  Blood pressure 118/72, pulse 75, temperature 97.3  F (36.3  C), resp. rate 11, SpO2 99 %.  Constitutional: Alert and oriented to person, place and time; no apparent distress  HEENT: normocephalic moist mucus membranes  Respiratory: lungs clear to auscultation bilaterally  Cardiovascular: regular S1 S2   GI: abdomen soft non tender non distended bowel sounds positive  Lymph/Hematologic: no pallor, no cervical lymphadenopathy  Skin: no rash, good turgor  Musculoskeletal: no clubbing, cyanosis or edema  Neurologic: extra-ocular muscles intact; moves all four extremities  Psychiatric: appropriate affect, insight and judgment    Data reviewed today:  I personally reviewed the EKG tracing showing NSR without ST segment changes. .    Recent Results (from the past 24 hour(s))   XR Chest 2 Views    Narrative    XR CHEST TWO VIEWS   11/5/2018 10:00 PM     HISTORY: Central chest pain.    COMPARISON: 10/21/2017      Impression    IMPRESSION: Lungs are well-inflated and clear. Heart size is  unchanged.    EVETTE MOORE MD       DVT Prophylaxis: Pneumatic Compression Devices  Code Status: Full Code    Disposition: Expected  discharge in 0-1 days    Ramirez Arevalo MD    Primary Care Physician   *[facility]    Past Medical History    I have reviewed this patient's medical history and updated it with pertinent information if needed.   Past Medical History:   Diagnosis Date     Cerebral infarction (H)     Reportedly; MRI MRA  was negative for acute infarct     GERD (gastroesophageal reflux disease)      Hypertension      Impaired glucose tolerance      Spinal stenosis        Past Surgical History   I have reviewed this patient's surgical history and updated it with pertinent information if needed.  Past Surgical History:   Procedure Laterality Date     GYN SURGERY           kidney stone surgery         Prior to Admission Medications   Prior to Admission Medications   Prescriptions Last Dose Informant Patient Reported? Taking?   HYDROCHLOROTHIAZIDE PO   Yes No   Sig: Take 12.5 mg by mouth daily    LISINOPRIL PO   Yes No   Sig: Take 40 mg by mouth daily    Naproxen Sodium (ALEVE PO)   Yes No   Sig: Take 220 mg by mouth 2 times daily as needed for moderate pain   Omeprazole (PRILOSEC PO)   Yes No   Sig: Take 20 mg by mouth 2 times daily (before meals)    acetaminophen-codeine (TYLENOL WITH CODEINE #3) 300-30 MG per tablet   No No   Sig: Take 1-2 tablets by mouth every 6 hours as needed for pain   albuterol (PROAIR HFA/PROVENTIL HFA/VENTOLIN HFA) 108 (90 BASE) MCG/ACT Inhaler   No No   Sig: Inhale 1-2 puffs into the lungs every 6 hours as needed for shortness of breath / dyspnea or wheezing   cholecalciferol (VITAMIN D3) 15623 UNITS capsule   No No   Sig: Take 1 capsule (50,000 Units) by mouth once a week   multivitamin, therapeutic with minerals (THERA-VIT-M) TABS   Yes No   Sig: Take 1 tablet by mouth daily   order for DME   No No   Sig: Equipment being ordered: Walker Wheels () and Walker ()  Treatment Diagnosis: Impaired gait and balance   oxybutynin chloride 15 MG TB24   Yes No   Sig:  Take 15 mg by mouth daily   traMADol (ULTRAM) 50 MG tablet   No No   Sig: Take 1-2 tablets ( mg) by mouth every 6 hours as needed for moderate pain      Facility-Administered Medications: None     Allergies   Allergies   Allergen Reactions     Naproxen Nausea     Oxycodone-Acetaminophen Nausea and Vomiting     Has inpt [facility] 7/2016 caused nausea/vomiting after 1 dose       Social History   I have reviewed this patient's social history and updated it with pertinent information if needed. Esmer KONG Mack  reports that she has quit smoking. She has never used smokeless tobacco. She reports that she does not drink alcohol or use illicit drugs.    Family History   Family history assessed and, except as above, is non-contributory.    Family History   Problem Relation Age of Onset     Diabetes Mother        Review of Systems   The 10 point Review of Systems is negative other than noted in the HPI or here.

## 2018-11-06 NOTE — PLAN OF CARE
A&Ox4, VSS on RA. Tele NSR. C/o midsternal chest pain 3/10, PRN tylenol given. Trops neg x3.  Up ad nas. Clear liq no caffeine diet, voiding adequately. IV SL. Plan for exercise stress test in AM. Will continue to monitor.

## 2018-11-06 NOTE — PLAN OF CARE
RECEIVING UNIT ED HANDOFF REVIEW    ED Nurse Handoff Report was reviewed by: Lynn Lewis on November 6, 2018 at 12:42 AM

## 2018-11-06 NOTE — PLAN OF CARE
Problem: Patient Care Overview  Goal: Plan of Care/Patient Progress Review  OBSERVATION GOALS    - Serial troponins and stress test complete: Trops: MET Stress test: NOT MET  - Seen and cleared by consultant if applicable: N/A  - Adequate pain control on oral analgesia: MET  - Vital signs normal or at patient baseline: MET  - Safe disposition plan has been identified: MET

## 2018-11-06 NOTE — DISCHARGE SUMMARY
[facility]    Discharge Summary  Hospitalist    Date of Admission:  11/5/2018  Date of Discharge:  11/6/2018  Discharging Provider: Terrance Lopez  Date of Service (when I saw the patient): 11/06/18    Discharge Diagnoses   1. Atypical chest pain, noncardiac etiology-likely GI related  2. GERD, possible source of chest pain  3. Impaired glucose tolerance, prediabetes  4. Hypertension  5. History of stroke    History of Present Illness    Esmer Churchill is a very pleasant 58 year old woman who presents with sharp substernal chest pressure that she first noticed last week while swimming.  It does not radiate and is associated with dizziness and blurry vision. It resolved with rest, and reoccurred again at the gym the next day, and again resolved with rest.  Today while not exerting herself she noticed the chest pain returned.  This time it lasted for several hours before resolving after she received 3 nitroglycerin tablets in the ED. She denies nausea or diaphoresis with it. She has had prior chest pain due to heartburn and tried Prilosec tablets without relief. She has had a mild non productive cough for the past couple of days. She denies fever, orthopnea, leg swelling, PND, abdominal complaints, or any other acute complaints. She went first to an Urgent care before being sent into our ED.     In the ED, her blood pressure was 118/72, pulse 75, temperature 97.3  F, resp. rate 11, SpO2 99 %.  CBC, BMP, D-Dimer, Troponin, and CXR were unremarkable and EKG showed NSR without ST segment changes.  She was admitted to observation care for further cardiac evaluation.    Hospital Course   Atypical chest pain: Not concerning for for ACS, but admitted to observation for cardiac rule out. Could be due to recurrent GERD or costochondritis given recent cough and sneezing.  - Telemetry normal sinus rhythm  - Serial troponins negative x3  - Exercise stress echocardiogram negative for inducible ischemia  -  Patient feels her symptoms are likely related to GERD attributed to increased coffee intake.    - Patient educated regarding foods and activities that can worsen GERD.  - Increase PTA omeprazole from 20 mg to 40 mg daily.  - Zantac and Tums as needed  - Follow-up with PCP closely for workup of noncardiac causes of chest pain.      Impaired glucose tolerance, borderline diabetes  - A1c this admission is 6.  - ISS insulin while hospitailzed  - Close outpatient follow up advised.      Hypertension: Blood pressure is controlled.    - Resume PTA lisinopril and hydrochlorothiazide      Prior stroke: By history; she was hospitalized here for bilateral lower extremity weakness in 2016 with negative MRI/MRA testing.      Terrance Lopez PA-C    I personally saw the patient on day of discharge to evaluate her, discuss test results, and plan of care.    Significant Results and Procedures   Stress echocardiogram negative for inducible ischemia.  No other procedures.  Test results as detailed above.    Pending Results   None    Code Status   Full Code       Primary Care Physician   [facility]    Discharge Disposition   Discharged to home  Condition at discharge: Stable    Consultations This Hospital Stay   None    Discharge Orders     Reason for your hospital stay   Observation hospitalization for evaluation of chest pain.  Your cardiac work up including your stress test was normal.     Follow-up and recommended labs and tests    Follow up with primary care provider within 7 days for hospital follow- up and for further workup of non-cardiac causes of chest pain.  Consider gastroenterology referral if pain persists.     Activity   Your activity upon discharge: activity as tolerated.     Full Code     Diet   Follow this diet upon discharge: Regular, Avoid foods with high acidity or that are spicy.  Limit caffeine and coffee intake.       Discharge Medications   Discharge Medication List as of  11/6/2018 12:07 PM      START taking these medications    Details   calcium carbonate (TUMS) 500 MG chewable tablet Take 1-2 tablets (500-1,000 mg) by mouth 3 times daily as needed for heartburn, OTC      ranitidine (ZANTAC) 75 MG tablet Take 2 tablets (150 mg) by mouth 2 times daily as needed for heartburn, OTC         CONTINUE these medications which have CHANGED    Details   omeprazole (PRILOSEC) 20 MG CR capsule Take 2 capsules (40 mg) by mouth, Historical         CONTINUE these medications which have NOT CHANGED    Details   albuterol (PROAIR HFA/PROVENTIL HFA/VENTOLIN HFA) 108 (90 BASE) MCG/ACT Inhaler Inhale 1-2 puffs into the lungs every 6 hours as needed for shortness of breath / dyspnea or wheezing, Disp-1 Inhaler, R-0, Local Print      hydrochlorothiazide 12.5 MG TABS tablet Take 12.5 mg by mouth daily, Historical      lisinopril (PRINIVIL/ZESTRIL) 40 MG tablet Take 40 mg by mouth daily, Historical      multivitamin, therapeutic with minerals (THERA-VIT-M) TABS Take 1 tablet by mouth daily, Historical      oxybutynin chloride 15 MG TB24 Take 15 mg by mouth daily, Historical      order for DME Equipment being ordered: Walker Wheels () and Walker ()  Treatment Diagnosis: Impaired gait and balanceDisp-1 each, R-0, Local Print         STOP taking these medications       ibuprofen (ADVIL/MOTRIN) 800 MG tablet Comments:   Reason for Stopping:             Allergies   Allergies   Allergen Reactions     Oxycodone Nausea and Vomiting     Data   Most Recent 3 CBC's:  Recent Labs   Lab Test  11/05/18   2028  10/21/17   0535  07/27/16   1434   WBC  4.0  4.1  4.7   HGB  13.4  12.2  13.1   MCV  89  88  88   PLT  223  205  208      Most Recent 3 BMP's:  Recent Labs   Lab Test  11/05/18   2028  10/21/17   0535  07/28/16   0827  07/27/16   1434   NA  140  139   --   143   POTASSIUM  3.9  3.3*  3.5  3.3*   CHLORIDE  108  106   --   108   CO2  28  26   --   27   BUN  21  21   --   16   CR  0.62  0.60   --   0.58    ANIONGAP  4  7   --   8   REGINALD  9.4  9.2   --   9.0   GLC  73  96   --   94     Most Recent 2 LFT's:  Recent Labs   Lab Test  07/26/16   1250   AST  20   ALT  28   ALKPHOS  82   BILITOTAL  0.4     Most Recent INR's and Anticoagulation Dosing History:  Anticoagulation Dose History     Recent Dosing and Labs Latest Ref Rng & Units 3/27/2011 7/27/2016    INR 0.86 - 1.14 0.97 0.94        Most Recent 3 Troponin's:  Recent Labs   Lab Test  11/06/18   0958  11/06/18   0500  11/06/18   0200   TROPI  <0.015  <0.015  <0.015     Most Recent Cholesterol Panel:No lab results found.  Most Recent 6 Bacteria Isolates From Any Culture (See EPIC Reports for Culture Details):  Recent Labs   Lab Test  07/27/16   1930  03/27/11   1842   CULT  No growth  No growth     Most Recent TSH, T4 and A1c Labs:  Recent Labs   Lab Test  11/06/18   0500   A1C  6.0*     Results for orders placed or performed during the hospital encounter of 11/05/18   XR Chest 2 Views    Narrative    XR CHEST TWO VIEWS   11/5/2018 10:00 PM     HISTORY: Central chest pain.    COMPARISON: 10/21/2017      Impression    IMPRESSION: Lungs are well-inflated and clear. Heart size is  unchanged.    EVETTE MOORE MD

## 2018-11-06 NOTE — PLAN OF CARE
PRIMARY DIAGNOSIS: CHEST PAIN  OUTPATIENT/OBSERVATION GOALS TO BE MET BEFORE DISCHARGE:  1. Ruled out acute coronary syndrome (negative or stable Troponin):  No  2. Pain Status: Improved-controlled with oral pain medications.  3. Appropriate provocative testing performed: No  - Stress Test Procedure: Echo  - Interpretation of cardiac rhythm per telemetry tech: NSR    4. Cleared by Consultants (if applicable):No  5. Return to near baseline physical activity: No  Discharge Planner Nurse   Safe discharge environment identified: No  Barriers to discharge: Yes       Entered by: Lynn Lewis 11/06/2018 2:56 AM     Please review provider order for any additional goals.   Nurse to notify provider when observation goals have been met and patient is ready for discharge.

## 2018-11-06 NOTE — ED NOTES
[facility]  ED Nurse Handoff Report    ED Chief complaint: Chest Pain (went to  today; sent here.  Pain started about 1430.  Similar pain last week while swimming; intermittent since then and more painful today.  Sharp pain with deep breaths)      ED Diagnosis:   Final diagnoses:   None       Code Status: Full Code    Allergies:   Allergies   Allergen Reactions     Naproxen Nausea     Oxycodone-Acetaminophen Nausea and Vomiting     Has inpt [facility] 7/2016 caused nausea/vomiting after 1 dose       Activity level - Baseline/Home:  Independent    Activity Level - Current:   Independent     Needed?: No    Isolation: No  Infection: Not Applicable  Bariatric?: No    Vital Signs:   Vitals:    11/05/18 2010 11/05/18 2012   BP:  (!) 131/92   Pulse: 75    Resp: 16    Temp: 97.3  F (36.3  C)    SpO2: 100%        Cardiac Rhythm: ,        Pain level:      Is this patient confused?: No   [place] - Suicide Severity Rating Scale Completed?  Yes  If yes, what color did the patient score?  White    Patient Report: Initial Complaint: chest pain  Focused Assessment:   Pt has new onset chest pain a week ago while swimming. Mid chest pain, got better when done swimming. This happened twice. Today pt was sitting and developed chest pain again. Reports some SOB. Pt felt better after 1st nitroglycerin, refused more nitroglycerin after that because of the taste it gave her.   Tests Performed: labs, xray  Abnormal Results: chest pain  Treatments provided: nitroglycerin, aspirin, GI cocktail    Family Comments: son at bedside    OBS brochure/video discussed/provided to patient/family: Yes              Name of person given brochure if not patient:               Relationship to patient:     ED Medications:   Medications   nitroGLYcerin (NITROSTAT) sublingual tablet 0.4 mg (0.4 mg Sublingual Given 11/5/18 2104)   aspirin chewable tablet 324 mg (324 mg Oral Given 11/5/18 2103)   lidocaine (viscous) (XYLOCAINE) 2 % 15  mL, alum & mag hydroxide-simethicone (MYLANTA ES/MAALOX  ES) 15 mL GI Cocktail (30 mLs Oral Given 11/5/18 2136)       Drips infusing?:  No    For the majority of the shift this patient was Green.   Interventions performed were n/a.    Severe Sepsis OR Septic Shock Diagnosis Present: No    To be done/followed up on inpatient unit:  stress test    ED NURSE PHONE NUMBER: [phone]

## 2018-11-06 NOTE — PLAN OF CARE
PRIMARY DIAGNOSIS: CHEST PAIN  OUTPATIENT/OBSERVATION GOALS TO BE MET BEFORE DISCHARGE:  1. Ruled out acute coronary syndrome (negative or stable Troponin):  No  2. Pain Status: Improved-controlled with oral pain medications.  3. Appropriate provocative testing performed: No  - Stress Test Procedure: Echo  - Interpretation of cardiac rhythm per telemetry tech: NSR    4. Cleared by Consultants (if applicable):No  5. Return to near baseline physical activity: No  Discharge Planner Nurse   Safe discharge environment identified: No  Barriers to discharge: Yes       Entered by: Lynn Lewis 11/06/2018 6:25 AM     Please review provider order for any additional goals.   Nurse to notify provider when observation goals have been met and patient is ready for discharge.

## 2018-11-06 NOTE — PLAN OF CARE
Problem: Patient Care Overview  Goal: Plan of Care/Patient Progress Review  Outcome: Adequate for Discharge Date Met: 11/06/18  Problem: Patient Care Overview  Goal: Plan of Care/Patient Progress Review  OBSERVATION GOALS    - Serial troponins and stress test complete: Trops: MET  - Seen and cleared by consultant if applicable: N/A  - Adequate pain control on oral analgesia: MET  - Vital signs normal or at patient baseline: MET  - Safe disposition plan has been identified: MET      A&Ox4. VSS on RA. Denies pain. Tele: SR. Trops negx3. Echo stress test performed. Up independently. Voiding adequately. Reviewed discharge instructions and medications w/ pt including basic GERD education and handouts. Pt to discharge home via son.

## 2018-11-06 NOTE — ED PROVIDER NOTES
"  History     Chief Complaint:  Chest Pain    The history is provided by the patient.      Esmer Churchill is a 58 year old female who presents with chest pain. The patient reports an onset of non-radiating substernal chest pain 8 days ago while swimming. Patient stopped swimming and had resolution of her pain. She had another episode the following day that again subsided with cessation of her activity. However, today patient had recurrence of her chest pain about 6.5 hours ago while she was at rest. She initially thought it was acid reflux and took a Prilosec. However, she had no improvement as she usually would and called nurse line who advised her to be evaluated. She visited Urgent Care where an EKG was obtained and patient was sent to ER due to continued pain. Patient states the pain is currently 6/10 but was previously worse to 9/10 and was \"so bad I couldn't talk.\" She describes the pain as a tight and cramping pain, like someone \"hit me\" in the center of her chest. She notes some shortness of breath and reports pain is exacerbated by deep breathing. She reports no alleviating factors. She remarks on 1 week of right leg swelling and mild pain and believes this is related to her spinal stenosis. She denies fever, cough, diaphoresis, nausea, or vomiting. She has taken no aspirin or analgesics prior to arrival. She denies recent travel, recent surgeries, or family history of cardiac disease.     CARDIAC RISK FACTORS:  Sex:    Female  Tobacco:   No  Hypertension:   Yes  Hyperlipidemia:  No  Diabetes:   No  Family History:  No - denies    PE/DVT RISK FACTORS:  Sex:    Female  Hormones:   No  Tobacco:   No  Cancer:   No  Travel:   No - denies  Surgery:   No - denies  Other immobilization: No  Personal history:  No  Family history:  No     Allergies:  Naproxen  Oxycodone-Acetaminophen     Medications:    Albuterol  Cholecalciferol  Hydrochlorothiazide   Lisinopril  Multivitamin  Omeprazole    Past Medical History:  "   Cerebral infarction  Hypertension  GERD    Past Surgical History:    Gyn surgery:     Family History:    History reviewed. No pertinent family history.      Social History:  Smoking status: Never smoker  Alcohol use: No   Marital Status:    PCP: Erum Granda [place]    Accompanied to the ED by family member.    Review of Systems   Constitutional: Negative for diaphoresis and fever.   Respiratory: Positive for shortness of breath. Negative for cough.    Cardiovascular: Positive for chest pain and leg swelling.   Gastrointestinal: Negative for nausea and vomiting.   All other systems reviewed and are negative.    Physical Exam   Patient Vitals for the past 24 hrs:   BP Temp Temp src Pulse Heart Rate Resp SpO2 Height Weight   18 2300 118/72 - - - 73 11 99 % - -   18 2230 130/78 - - - 64 12 94 % - -   18 2130 108/73 - - - 63 - - - -   18 2100 134/88 - - - 73 16 94 % - -   18 (!) 126/92 - - - 67 - 96 % - -   18 (!) 131/92 - - - - - - - -   18 - 97.3  F (36.3  C) - 75 75 16 100 % - -      Physical Exam  General: Well-developed and well-nourished. Well appearing middle aged  woman. Cooperative.  Head:  Atraumatic.  Eyes:  Conjunctivae, lids, and sclerae are normal.  ENT:    Normal nose. Moist mucous membranes.  Neck:  Supple. Normal range of motion.  CV:  Regular rate and rhythm. Normal heart sounds with no murmurs, rubs, or gallops detected.  Resp:  No respiratory distress. Clear to auscultation bilaterally without decreased breath sounds, wheezing, rales, or rhonchi.  GI:  Soft. Non-distended. Non-tender.    MS:  Normal ROM. Slight non-pitting right lower extremity edema as compared to the left without calf tenderness. Mild tenderness to palpation of the anterior chest wall.   Skin:  Warm. Non-diaphoretic. No pallor.  Neuro:  Awake. A&Ox3. Normal strength.  Psych: Normal mood and affect. Normal speech.  Vitals  reviewed.      Emergency Department Course   EKG  Indication: Chest pain  Time: 20:12:18  Rate 76 bpm. DC interval 190. QRS duration 94. QT/QTc 408/459.   Normal sinus rhythm. Normal ECG.    No acute ST changes.  No significant change as compared to prior, dated 10/21/17.     Imaging:  Radiographic findings were communicated with the patient who voiced understanding of the findings.  XR Chest 2 Views  Lungs are well-inflated and clear. Heart size is  unchanged.  As read by Radiology.     Laboratory:  CBC: WNL (WBC 4.0, HGB 13.4, )  BMP: WNL (Creatinine 0.62)  D-dimer (2028): <0.3  Troponin (2124): <0.015     Interventions:  2103: Aspirin chewable tablet 324 mg oral  2104: Nitrostat 0.4 mg sublingual  2136: GI cocktail 30 mLs oral  2 additional Nitrostat - see MAR    Emergency Department Course:  2012: ECG obtained, findings as noted above.    Past medical records, nursing notes, and vitals reviewed.  2025: I performed an exam of the patient and obtained history, as documented above.   2028: Peripheral IV established. Blood drawn for basic laboratory. D-dimer and troponin obtained. Results as noted above.    The patient was sent for a chest XR while in the emergency department, findings above.   Patient re-evaluated. Reports initial improvement in pain after first nitroglycerin that increased back to 7/10 when she went for xray. Agrees to admission.  2320: I discussed the case with Dr. Arevalo, hospitalist service, who accepts the patient for further care, monitoring, and treatment.   2354: I rechecked the patient. She states her pain is down to a 3/10.     HEART Score  Background  Calculates the overall risk of adverse event in patient's presenting with chest pain.  Based on 5 criteria (each assigned 0-2 points) including suspiciousness of history, EKG, age, risk factors and troponin.    Data  58 year old female  Bursitis, hip; Lower extremity weakness; and Weakness of both legs  HTN  Never smoker  Denies FHx  premature CAD  Lab Results   Component Value Date    TROPI <0.015 11/05/2018     Criteria   0-2 points for each of 5 items (maximum of 10 points):  Score 1- History moderately suspicious for coronary syndrome  Score 0- EKG Normal  Score 1- Age 45 to 65 years old  Score 1- One to 2 risk factors for atherosclerotic disease  Score 0- Within normal limits for troponin levels  Interpretation  Risk of adverse outcome  Heart Score: 3  Total Score 0-3- Adverse Outcome Risk 2.5% - Supports early discharge with appropriate follow-up    Impression & Plan    Medical Decision Making:  Esmer is a 58-year-old female who presents with chest pain.  She first had this chest pain when she was exercising about 8 days ago and it resolved at rest.  She had one other similar episode.  Today's episode started at rest and has not improved.  It is substernal and pleuritic.  She has no PE or DVT risk factors though she does remark that her right lower extremity is somewhat swollen as compared to the left which she attributes to her spinal stenosis.  Fortunately, D-dimer is negative essentially ruling out PE or DVT.  The remainder of her exam is unremarkable.  Her EKG is similarly reassuring without acute ST changes or arrhythmias.  Laboratory studies are unremarkable otherwise including normal electrolytes, no leukocytosis, and no anemia.  Troponin is negative which is quite reassuring given her pain is been present for approximately 6 hours.  Finally, chest x-ray reveals no acute pathology such as pneumothorax, pulmonary edema, or widened mediastinum.  She will initially thought her chest pain was related to GERD though she had no improvement with Prilosec at home and no improvement with GI cocktail here.  Patient was given aspirin.  She was also given nitroglycerin.  She had initial marked improvement in her pain after the first nitro though she states it returned again when she went to chest x-ray to a level of 7/10.  Pain improved to  5/10 after second nitroglycerin and finally 3/10 after the third nitroglycerin.  Given patient's concerning story - including chest pain that started initially with exercise and resolved with rest and now is presenting at rest, in addition to improvement with nitroglycerin here, I believe patient warrants stress testing.  Her HEART score is low at 3 so I discussed a possible discharge with outpatient stress testing though patient does not feel comfortable with that plan given she continues to have pain.  I believe admission for stress testing sooner is a reasonable option given the concerning nature of her pain as well as patient's hesitancy to arrange as an outpatient.  Though this possibly could represent unstable angina, there is no evidence of NSTEMI with a negative troponin after 6 hours and heparin is not indicated. I suspect a degree of costochondritis may be contributing to pain as patient reports recent swimming and has mild chest wall tenderness.  I have discussed the results of laboratory and imaging studies with the patient as well as plan for admission and she verbalizes understanding.  I have discussed patient's case with Dr. Arevalo, hospitalist, who accepts admission and has no further orders.    Diagnosis:    ICD-10-CM   1. Chest pain R07.9     Disposition:  Admitted to observation unit.    Mecca Contreras  11/5/2018    EMERGENCY DEPARTMENT  I, Mecca Contreras, am serving as a scribe at 8:21 PM on 11/5/2018 to document services personally performed by Tabatha White MD based on my observations and the provider's statements to me.       Tabatha White MD  11/06/18 0230

## 2018-11-06 NOTE — PHARMACY-ADMISSION MEDICATION HISTORY
Admission medication history interview status for the 11/5/2018  admission is complete. See EPIC admission navigator for prior to admission medications     Medication history source reliability:Good    Actions taken by pharmacist (provider contacted, etc):None     Additional medication history information not noted on PTA med list :Deepti knows her medications and is knowledgeable about doses and instructions.    Medication reconciliation/reorder completed by provider prior to medication history? No    Time spent in this activity: 15 minutes    Prior to Admission medications    Medication Sig Last Dose Taking? Auth Provider   albuterol (PROAIR HFA/PROVENTIL HFA/VENTOLIN HFA) 108 (90 BASE) MCG/ACT Inhaler Inhale 1-2 puffs into the lungs every 6 hours as needed for shortness of breath / dyspnea or wheezing Past Week at Unknown time Yes Lloyd Natarajan MD   hydrochlorothiazide 12.5 MG TABS tablet Take 12.5 mg by mouth daily 11/5/2018 at 0800 Yes Unknown, Entered By History   ibuprofen (ADVIL/MOTRIN) 800 MG tablet Take 800 mg by mouth every 8 hours as needed for moderate pain 11/5/2018 at Unknown time Yes Unknown, Entered By History   lisinopril (PRINIVIL/ZESTRIL) 40 MG tablet Take 40 mg by mouth daily 11/5/2018 at 0800 Yes Unknown, Entered By History   multivitamin, therapeutic with minerals (THERA-VIT-M) TABS Take 1 tablet by mouth daily 11/5/2018 at 0800 Yes Unknown, Entered By History   omeprazole (PRILOSEC) 20 MG CR capsule Take 20 mg by mouth 2 times daily 11/5/2018 at 2000 Yes Unknown, Entered By History   oxybutynin chloride 15 MG TB24 Take 15 mg by mouth daily 11/5/2018 at 0800 Yes Unknown, Entered By History   order for DME Equipment being ordered: Walker Wheels () and Walker ()  Treatment Diagnosis: Impaired gait and balance   Nora Hoyos MD

## 2019-03-27 ENCOUNTER — HOSPITAL ENCOUNTER (EMERGENCY)
Facility: CLINIC | Age: 59
Discharge: HOME | End: 2019-03-27
Attending: PHYSICIAN ASSISTANT | Admitting: PHYSICIAN ASSISTANT
Payer: COMMERCIAL

## 2019-03-27 VITALS
BODY MASS INDEX: 31.15 KG/M2 | OXYGEN SATURATION: 94 % | DIASTOLIC BLOOD PRESSURE: 84 MMHG | TEMPERATURE: 99.4 F | SYSTOLIC BLOOD PRESSURE: 137 MMHG | WEIGHT: 165 LBS | HEIGHT: 61 IN

## 2019-03-27 DIAGNOSIS — T50.905A MEDICATION REACTION, INITIAL ENCOUNTER: ICD-10-CM

## 2019-03-27 DIAGNOSIS — R52 GENERALIZED BODY ACHES: ICD-10-CM

## 2019-03-27 LAB
ALBUMIN SERPL-MCNC: 3.7 G/DL (ref 3.4–5)
ALP SERPL-CCNC: 88 U/L (ref 40–150)
ALT SERPL W P-5'-P-CCNC: 33 U/L (ref 0–50)
ANION GAP SERPL CALCULATED.3IONS-SCNC: 5 MMOL/L (ref 3–14)
AST SERPL W P-5'-P-CCNC: 20 U/L (ref 0–45)
BASOPHILS # BLD AUTO: 0 10E9/L (ref 0–0.2)
BASOPHILS NFR BLD AUTO: 0.4 %
BILIRUB SERPL-MCNC: 0.4 MG/DL (ref 0.2–1.3)
BUN SERPL-MCNC: 23 MG/DL (ref 7–30)
CALCIUM SERPL-MCNC: 9.5 MG/DL (ref 8.5–10.1)
CHLORIDE SERPL-SCNC: 110 MMOL/L (ref 94–109)
CO2 SERPL-SCNC: 26 MMOL/L (ref 20–32)
CREAT SERPL-MCNC: 0.65 MG/DL (ref 0.52–1.04)
DIFFERENTIAL METHOD BLD: ABNORMAL
EOSINOPHIL # BLD AUTO: 0 10E9/L (ref 0–0.7)
EOSINOPHIL NFR BLD AUTO: 0.2 %
ERYTHROCYTE [DISTWIDTH] IN BLOOD BY AUTOMATED COUNT: 15.9 % (ref 10–15)
GFR SERPL CREATININE-BSD FRML MDRD: >90 ML/MIN/{1.73_M2}
GLUCOSE SERPL-MCNC: 95 MG/DL (ref 70–99)
HCT VFR BLD AUTO: 40.9 % (ref 35–47)
HGB BLD-MCNC: 13.1 G/DL (ref 11.7–15.7)
IMM GRANULOCYTES # BLD: 0 10E9/L (ref 0–0.4)
IMM GRANULOCYTES NFR BLD: 0.2 %
LIPASE SERPL-CCNC: 111 U/L (ref 73–393)
LYMPHOCYTES # BLD AUTO: 0.7 10E9/L (ref 0.8–5.3)
LYMPHOCYTES NFR BLD AUTO: 14.7 %
MCH RBC QN AUTO: 28.3 PG (ref 26.5–33)
MCHC RBC AUTO-ENTMCNC: 32 G/DL (ref 31.5–36.5)
MCV RBC AUTO: 88 FL (ref 78–100)
MONOCYTES # BLD AUTO: 0.4 10E9/L (ref 0–1.3)
MONOCYTES NFR BLD AUTO: 9 %
NEUTROPHILS # BLD AUTO: 3.5 10E9/L (ref 1.6–8.3)
NEUTROPHILS NFR BLD AUTO: 75.5 %
NRBC # BLD AUTO: 0 10*3/UL
NRBC BLD AUTO-RTO: 0 /100
PLATELET # BLD AUTO: 176 10E9/L (ref 150–450)
POTASSIUM SERPL-SCNC: 4 MMOL/L (ref 3.4–5.3)
PROT SERPL-MCNC: 7 G/DL (ref 6.8–8.8)
RBC # BLD AUTO: 4.63 10E12/L (ref 3.8–5.2)
SODIUM SERPL-SCNC: 141 MMOL/L (ref 133–144)
WBC # BLD AUTO: 4.7 10E9/L (ref 4–11)

## 2019-03-27 PROCEDURE — 25000128 H RX IP 250 OP 636: Performed by: PHYSICIAN ASSISTANT

## 2019-03-27 PROCEDURE — 85025 COMPLETE CBC W/AUTO DIFF WBC: CPT | Performed by: PHYSICIAN ASSISTANT

## 2019-03-27 PROCEDURE — 80053 COMPREHEN METABOLIC PANEL: CPT | Performed by: PHYSICIAN ASSISTANT

## 2019-03-27 PROCEDURE — 96374 THER/PROPH/DIAG INJ IV PUSH: CPT

## 2019-03-27 PROCEDURE — 99284 EMERGENCY DEPT VISIT MOD MDM: CPT | Mod: 25

## 2019-03-27 PROCEDURE — 83690 ASSAY OF LIPASE: CPT | Performed by: PHYSICIAN ASSISTANT

## 2019-03-27 RX ORDER — KETOROLAC TROMETHAMINE 15 MG/ML
15 INJECTION, SOLUTION INTRAMUSCULAR; INTRAVENOUS ONCE
Status: COMPLETED | OUTPATIENT
Start: 2019-03-27 | End: 2019-03-27

## 2019-03-27 RX ADMIN — KETOROLAC TROMETHAMINE 15 MG: 15 INJECTION, SOLUTION INTRAMUSCULAR; INTRAVENOUS at 14:10

## 2019-03-27 ASSESSMENT — ENCOUNTER SYMPTOMS
COUGH: 0
RHINORRHEA: 0
ACTIVITY CHANGE: 1
DYSURIA: 0
DIARRHEA: 0
VOMITING: 0
CONSTIPATION: 0
APPETITE CHANGE: 1
ABDOMINAL PAIN: 1
SHORTNESS OF BREATH: 0
FATIGUE: 1
DIFFICULTY URINATING: 0
NAUSEA: 0
FEVER: 1
CHILLS: 1

## 2019-03-27 ASSESSMENT — MIFFLIN-ST. JEOR: SCORE: 1260.82

## 2019-03-27 NOTE — DISCHARGE INSTRUCTIONS
Take ibuprofen 600 mg 3x per day with food. This will provide both pain control and fight against inflammation.   Follow-up with primary care provider in 2-3 days for recheck.    Return to emergency department for fevers greater than 101.4, chest pain, abdominal pain, shortness of breath, or any other new/concerning symptoms.

## 2019-03-27 NOTE — ED AVS SNAPSHOT
Emergency Department  [address] [place] [address]  Phone:  [phone]  Fax:  [phone]                                    Esmer Churchill   MRN: [id number]    Department:   Emergency Department   Date of Visit:  3/27/2019           After Visit Summary Signature Page    I have received my discharge instructions, and my questions have been answered. I have discussed any challenges I see with this plan with the nurse or doctor.    ..........................................................................................................................................  Patient/Patient Representative Signature      ..........................................................................................................................................  Patient Representative Print Name and Relationship to Patient    ..................................................               ................................................  Date                                   Time    ..........................................................................................................................................  Reviewed by Signature/Title    ...................................................              ..............................................  Date                                               Time          22EPIC Rev 08/18

## 2019-03-27 NOTE — ED PROVIDER NOTES
"  History     Chief Complaint:  Body aches    HPI   Esmer Churchill is a 58 year old female who presents with chills, right sided  and, headache. The patient states that yesterday she had a routine shingles vaccine received along her left arm. Since that time, the patient states she has developed chills and a pounding headache. Likewise she has had a pain along her entire right side and a pounding headache. Today, the patient states she developed a right sided abdominal/side/flank pain that felt like she was \"punched\" and comes here for evaluation. The patient denies chest pain or shortness of breath with her symptoms. She denies nausea, vomiting, diarrhea, cough/cold symptoms.     Allergies:  Oxycodone      Medications:    Albuterol inhaler  Hydrochlorothiazide   Omeprazole  Lisinopril  Zantac      Past Medical History:    GERD  Cerebral infarction  Hypertension  Spinal stenosis  Impaired glucose tolerance   Kidney stone    Past Surgical History:      Kidney stone     Family History:    History reviewed. No pertinent family history.      Social History:  Smoking Status: Former Smoker  Alcohol Use: No  Patient presents with family/friend.   Marital Status:       Review of Systems   Constitutional: Positive for activity change, appetite change, chills, fatigue and fever.   HENT: Negative for congestion and rhinorrhea.    Respiratory: Negative for cough and shortness of breath.    Cardiovascular: Negative for chest pain.   Gastrointestinal: Positive for abdominal pain. Negative for constipation, diarrhea, nausea and vomiting.   Genitourinary: Negative for difficulty urinating and dysuria.   All other systems reviewed and are negative.      Physical Exam     Patient Vitals for the past 24 hrs:   BP Temp Temp src Heart Rate SpO2 Height Weight   19 1323 137/84 -- -- -- -- -- --   19 1321 -- 99.4  F (37.4  C) Oral 94 94 % 1.549 m (5' 1\") 74.8 kg (165 lb)      Physical Exam  General: Alert, " interactive. GCS 15  Head:  Scalp is atraumatic.  Eyes:  EOM intact. The pupils are equal, round, and reactive to light. No scleral icterus.  ENT:                                      Ears:  The external ears are normal.  Nose:  The external nose is normal.  Throat:  The oropharynx is normal. Mucus membranes are moist.                 Neck:  Normal range of motion. There is no rigidity.   CV:  Regular rate and rhythm. No murmur. 2+ radial pulses  Resp:  Breath sounds are clear bilaterally. Non-labored, no retractions or accessory muscle use.  GI:  Abdomen is soft, no distension, RUQ tenderness. Remainder of abdomen nontender; no cva tenderness.   MS:  Normal range of motion.   Skin:  Warm and dry.   Neuro: Cranial nerves 2-12 intact; 5/5 strength throughout the upper and lower extremities; sensation intact to light touch throughout the upper and lower extremities;  normal finger to nose; normal gait, No meningismus   Psych:  Awake. Alert.  Appropriate interactions.     Emergency Department Course     Laboratory:  CBC: WNL (WBC 4.7, HGB 13.1, )   CMP: Chloride 110 (H), (Creatinine 0.65)   Lipase: 111    Interventions:  1410 - Toradol 15 mg IV    Emergency Department Course:  Past medical records, nursing notes, and vitals reviewed.  1337: I performed an exam of the patient and obtained history, as documented above.    IV inserted and blood drawn.     1449: I rechecked the patient. Findings and plan explained to the Patient. Patient discharged home with instructions regarding supportive care, medications, and reasons to return. The importance of close follow-up was reviewed.         Impression & Plan      Medical Decision Making:  Esmer Churchill is a 58 year old female presents emergency department today with concern of possible medication reaction.  She had a shingles vaccine yesterday and is concerned that she is having a reaction to this medication.  Vitals are normal on arrival and patient is overall  well-appearing.  She is very vague symptoms upon presentation including chills and reporting the entire right side of her body hurt.  No focal neurologic symptoms.  She had no complaints of abdominal pain, though on exam had mild right upper quadrant tenderness and with further questioning she is noted the abdominal pain started today.     Lab work including CBC, CMP, and lipase overall unremarkable.  No elevated liver enzymes or alk phos to suggest hepatobiliary etiology.  On exam, after Toradol, abdominal exam benign without localized tenderness.  I doubt emergent intra-abdominal etiology including pyelonephritis, cholecystitis, pancreatitis, perforated ulcer nephrolithiasis, bowel obstruction, among others.  He is overall well-appearing and appropriate for discharge home.  Stable in the emergency department.  I discussed the possibility of medication reaction, though my suspicion for this is extremely low.  She plans to rest, drink plenty of fluids, use Tylenol/ibuprofen as needed for pain.  Close follow-up with primary care provider in 2-3 days.  Patient agrees with plan all questions and concerns addressed prior to discharge home. Reasons to return emphasized including fevers, worsening abdominal pain, or any other new/concerning symptoms.     Diagnosis:    ICD-10-CM    1. Generalized body aches R52    2. Medication reaction, initial encounter T50.905A     concern for       Misael Rizvi  3/27/2019    EMERGENCY DEPARTMENT  I, Misael Rizvi, am serving as a scribe at 1:37 PM on 3/27/2019 to document services personally performed by Harriet Srinivasan PA-C based on my observations and the provider's statements to me.       Harriet Srinivasan PA-C  03/28/19 1110

## 2020-02-26 ENCOUNTER — HOSPITAL ENCOUNTER (EMERGENCY)
Facility: CLINIC | Age: 60
Discharge: HOME | End: 2020-02-26
Attending: EMERGENCY MEDICINE | Admitting: EMERGENCY MEDICINE
Payer: COMMERCIAL

## 2020-02-26 VITALS
DIASTOLIC BLOOD PRESSURE: 91 MMHG | SYSTOLIC BLOOD PRESSURE: 130 MMHG | OXYGEN SATURATION: 99 % | HEART RATE: 105 BPM | RESPIRATION RATE: 16 BRPM | BODY MASS INDEX: 31.34 KG/M2 | WEIGHT: 166 LBS | HEIGHT: 61 IN | TEMPERATURE: 96.8 F

## 2020-02-26 DIAGNOSIS — R19.7 NAUSEA VOMITING AND DIARRHEA: ICD-10-CM

## 2020-02-26 DIAGNOSIS — R10.9 ABDOMINAL PAIN, UNSPECIFIED ABDOMINAL LOCATION: ICD-10-CM

## 2020-02-26 DIAGNOSIS — R11.2 NAUSEA VOMITING AND DIARRHEA: ICD-10-CM

## 2020-02-26 LAB
ALBUMIN SERPL-MCNC: 3.7 G/DL (ref 3.4–5)
ALP SERPL-CCNC: 80 U/L (ref 40–150)
ALT SERPL W P-5'-P-CCNC: 31 U/L (ref 0–50)
ANION GAP SERPL CALCULATED.3IONS-SCNC: 4 MMOL/L (ref 3–14)
AST SERPL W P-5'-P-CCNC: 21 U/L (ref 0–45)
BASOPHILS # BLD AUTO: 0 10E9/L (ref 0–0.2)
BASOPHILS NFR BLD AUTO: 0 %
BILIRUB SERPL-MCNC: 0.9 MG/DL (ref 0.2–1.3)
BUN SERPL-MCNC: 27 MG/DL (ref 7–30)
CALCIUM SERPL-MCNC: 9.4 MG/DL (ref 8.5–10.1)
CHLORIDE SERPL-SCNC: 105 MMOL/L (ref 94–109)
CO2 SERPL-SCNC: 28 MMOL/L (ref 20–32)
CREAT SERPL-MCNC: 0.78 MG/DL (ref 0.52–1.04)
DIFFERENTIAL METHOD BLD: ABNORMAL
EOSINOPHIL # BLD AUTO: 0 10E9/L (ref 0–0.7)
EOSINOPHIL NFR BLD AUTO: 0.2 %
ERYTHROCYTE [DISTWIDTH] IN BLOOD BY AUTOMATED COUNT: 16.4 % (ref 10–15)
GFR SERPL CREATININE-BSD FRML MDRD: 82 ML/MIN/{1.73_M2}
GLUCOSE SERPL-MCNC: 104 MG/DL (ref 70–99)
HCT VFR BLD AUTO: 46.9 % (ref 35–47)
HGB BLD-MCNC: 14.7 G/DL (ref 11.7–15.7)
IMM GRANULOCYTES # BLD: 0 10E9/L (ref 0–0.4)
IMM GRANULOCYTES NFR BLD: 0.2 %
LIPASE SERPL-CCNC: 79 U/L (ref 73–393)
LYMPHOCYTES # BLD AUTO: 0.7 10E9/L (ref 0.8–5.3)
LYMPHOCYTES NFR BLD AUTO: 10 %
MCH RBC QN AUTO: 28 PG (ref 26.5–33)
MCHC RBC AUTO-ENTMCNC: 31.3 G/DL (ref 31.5–36.5)
MCV RBC AUTO: 89 FL (ref 78–100)
MONOCYTES # BLD AUTO: 0.3 10E9/L (ref 0–1.3)
MONOCYTES NFR BLD AUTO: 4.1 %
NEUTROPHILS # BLD AUTO: 5.6 10E9/L (ref 1.6–8.3)
NEUTROPHILS NFR BLD AUTO: 85.5 %
NRBC # BLD AUTO: 0 10*3/UL
NRBC BLD AUTO-RTO: 0 /100
PLATELET # BLD AUTO: 211 10E9/L (ref 150–450)
POTASSIUM SERPL-SCNC: 3.7 MMOL/L (ref 3.4–5.3)
PROT SERPL-MCNC: 8 G/DL (ref 6.8–8.8)
RBC # BLD AUTO: 5.25 10E12/L (ref 3.8–5.2)
SODIUM SERPL-SCNC: 137 MMOL/L (ref 133–144)
WBC # BLD AUTO: 6.5 10E9/L (ref 4–11)

## 2020-02-26 PROCEDURE — 80053 COMPREHEN METABOLIC PANEL: CPT | Performed by: EMERGENCY MEDICINE

## 2020-02-26 PROCEDURE — 25000128 H RX IP 250 OP 636: Performed by: EMERGENCY MEDICINE

## 2020-02-26 PROCEDURE — 85025 COMPLETE CBC W/AUTO DIFF WBC: CPT | Performed by: EMERGENCY MEDICINE

## 2020-02-26 PROCEDURE — 99284 EMERGENCY DEPT VISIT MOD MDM: CPT | Mod: 25

## 2020-02-26 PROCEDURE — 96374 THER/PROPH/DIAG INJ IV PUSH: CPT

## 2020-02-26 PROCEDURE — 96361 HYDRATE IV INFUSION ADD-ON: CPT

## 2020-02-26 PROCEDURE — 83690 ASSAY OF LIPASE: CPT | Performed by: EMERGENCY MEDICINE

## 2020-02-26 PROCEDURE — 25800030 ZZH RX IP 258 OP 636: Performed by: EMERGENCY MEDICINE

## 2020-02-26 RX ORDER — ONDANSETRON 4 MG/1
4 TABLET, ORALLY DISINTEGRATING ORAL EVERY 8 HOURS PRN
Qty: 12 TABLET | Refills: 0 | Status: SHIPPED | OUTPATIENT
Start: 2020-02-26 | End: 2020-03-01

## 2020-02-26 RX ORDER — ONDANSETRON 2 MG/ML
4 INJECTION INTRAMUSCULAR; INTRAVENOUS ONCE
Status: COMPLETED | OUTPATIENT
Start: 2020-02-26 | End: 2020-02-26

## 2020-02-26 RX ADMIN — ONDANSETRON 4 MG: 2 INJECTION INTRAMUSCULAR; INTRAVENOUS at 10:09

## 2020-02-26 RX ADMIN — SODIUM CHLORIDE 1000 ML: 9 INJECTION, SOLUTION INTRAVENOUS at 10:07

## 2020-02-26 ASSESSMENT — ENCOUNTER SYMPTOMS
ABDOMINAL PAIN: 1
VOMITING: 1
FEVER: 0
NAUSEA: 1
DIARRHEA: 1

## 2020-02-26 ASSESSMENT — MIFFLIN-ST. JEOR: SCORE: 1263.52

## 2020-02-26 NOTE — DISCHARGE INSTRUCTIONS
Discharge Instructions  Abdominal Pain    Abdominal pain (belly pain) can be caused by many things. Your evaluation today does not show the exact cause for your pain. Your provider today has decided that it is unlikely your pain is due to a life threatening problem, or a problem requiring surgery or hospital admission. Sometimes those problems cannot be found right away, so it is very important that you follow up as directed.  Sometimes only the changes which occur over time allow the cause of your pain to be found.    Generally, every Emergency Department visit should have a follow-up clinic visit with either a primary or a specialty clinic/provider. Please follow-up as instructed by your emergency provider today. With abdominal pain, we often recommend very close follow-up, such as the following day.    ADULTS:  Return to the Emergency Department right away if:    You get an oral temperature above 102oF or as directed by your provider.  You have blood in your stools. This may be bright red or appear as black, tarry stools.    You keep vomiting (throwing up) or cannot drink liquids.  You see blood when you vomit.   You cannot have a bowel movement or you cannot pass gas.  Your stomach gets bloated or bigger.  Your skin or the whites of your eyes look yellow.  You faint.  You have bloody, frequent or painful urination (peeing).  You have new symptoms or anything that worries you.    CHILDREN:  Return to the Emergency Department right away if your child has any of the above-listed symptoms or the following:    Pushes your hand away or screams/cries when his/her belly is touched.  You notice your child is very fussy or weak.  Your child is very tired and is too tired to eat or drink.  Your child is dehydrated.  Signs of dehydration can be:  Significant change in the amount of wet diapers/urine.  Your infant or child starts to have dry mouth and lips, or no saliva (spit) or tears.    PREGNANT WOMEN:  Return to the  Emergency Department right away if you have any of the above-listed symptoms or the following:    You have bleeding, leaking fluid or passing tissue from the vagina.  You have worse pain or cramping, or pain in your shoulder or back.  You have vomiting that will not stop.  You have a temperature of 100oF or more.  Your baby is not moving as much as usual.  You faint.  You get a bad headache with or without eye problems and abdominal pain.  You have a seizure.  You have unusual discharge from your vagina and abdominal pain.    Abdominal pain is pretty common during pregnancy.  Your pain may or may not be related to your pregnancy. You should follow-up closely with your OB provider so they can evaluate you and your baby.  Until you follow-up with your regular provider, do the following:     Avoid sex and do not put anything in your vagina.  Drink clear fluids.  Only take medications approved by your provider.    MORE INFORMATION:    Appendicitis:  A possible cause of abdominal pain in any person who still has their appendix is acute appendicitis. Appendicitis is often hard to diagnose.  Testing does not always rule out early appendicitis or other causes of abdominal pain. Close follow-up with your provider and re-evaluations may be needed to figure out the reason for your abdominal pain.    Follow-up:  It is very important that you make an appointment with your clinic and go to the appointment.  If you do not follow-up with your primary provider, it may result in missing an important development which could result in permanent injury or disability and/or lasting pain.  If there is any problem keeping your appointment, call your provider or return to the Emergency Department.    Medications:  Take your medications as directed by your provider today.  Before using over-the-counter medications, ask your provider and make sure to take the medications as directed.  If you have any questions about medications, ask your  "provider.    Diet:  Resume your normal diet as much as possible, but do not eat fried, fatty or spicy foods while you have pain.  Do not drink alcohol or have caffeine.  Do not smoke tobacco.    Probiotics: If you have been given an antibiotic, you may want to also take a probiotic pill or eat yogurt with live cultures. Probiotics have \"good bacteria\" to help your intestines stay healthy. Studies have shown that probiotics help prevent diarrhea (loose stools) and other intestine problems (including C. diff infection) when you take antibiotics. You can buy these without a prescription in the pharmacy section of the store.     If you were given a prescription for medicine here today, be sure to read all of the information (including the package insert) that comes with your prescription.  This will include important information about the medicine, its side effects, and any warnings that you need to know about.  The pharmacist who fills the prescription can provide more information and answer questions you may have about the medicine.  If you have questions or concerns that the pharmacist cannot address, please call or return to the Emergency Department.       Remember that you can always come back to the Emergency Department if you are not able to see your regular provider in the amount of time listed above, if you get any new symptoms, or if there is anything that worries you.    Discharge Instructions  Nausea, vomiting, diarrhea    You have been seen today for vomiting (throwing up) and diarrhea (loose stools), called gastroenteritis or the stomach flu. This is usually caused by a virus, but some bacteria, parasites, medicines or other medical conditions can cause similar symptoms. At this time your provider does not find that your vomiting and diarrhea is a sign of anything dangerous or life-threatening.  However, sometimes the signs of serious illness do not show up right away. Remember that serious problems like " appendicitis can look like gastroenteritis at first.       Generally, every Emergency Department visit should have a follow-up clinic visit with either a primary or a specialty clinic/provider. Please follow-up as instructed by your emergency provider today.    Return to the Emergency Department if:  You keep vomiting and you are not able to keep liquids down.  You feel you are getting dehydrated, such as being very thirsty, not urinating (peeing), or feeling faint or lightheaded.   You develop a new fever.  You have abdominal (belly) pain that seems worse than cramps, is in one spot, or is getting worse over time.   You have blood in your vomit or in your diarrhea.  You feel very weak.    What can I do to help myself?  The most important thing to do is to drink clear liquids.  If you have been vomiting a lot, it is best to have only small, frequent sips of liquids.  Drinking too much at once may cause more vomiting. Water is a good first option for rehydration. If you are vomiting often, you must also replace electrolytes (salts and minerals) lost with your illness. Pedialyte  is the best rehydration liquid but many don t like the taste so sports drinks (like Gatorade ) are a good option. Sodas and juice are also options but are high in sugar. Avoid acid liquids (orange), caffeine (coffee) or alcohol. Do not drink milk until you no longer have diarrhea.  After liquids are staying down, you may start eating mild foods. Soda crackers, toast, plain noodles, gelatin, applesauce and bananas are good first choices.  Avoid foods that have acid, are spicy, fatty or fibrous (such as meats, coarse grains, vegetables). You may start eating these foods again in about 3 days when you are better.  Sometimes treatment includes prescription medicine to prevent nausea (sick to your stomach) and vomiting and to prevent diarrhea. If your provider prescribes these for you, take them as directed.  Nonprescription medicine is available  for the treatment of diarrhea and can be very effective.  If you use it, make sure you use the dose recommended on the package. Avoid Lomotil . Check with your healthcare provider before you use any medicine for diarrhea.  Do not take ibuprofen, or other nonsteroidal anti-inflammatory medicines without checking with your healthcare provider.  If you were given a prescription for medicine here today, be sure to read all of the information (including the package insert) that comes with your prescription.  This will include important information about the medicine, its side effects, and any warnings that you need to know about.  The pharmacist who fills the prescription can provide more information and answer questions you may have about the medicine.  If you have questions or concerns that the pharmacist cannot address, please call or return to the Emergency Department.   Remember that you can always come back to the Emergency Department if you are not able to see your regular provider in the amount of time listed above, if you get any new symptoms, or if there is anything that worries you.

## 2020-02-26 NOTE — ED AVS SNAPSHOT
Emergency Department  [address] [place] [address]  Phone:  [phone]  Fax:  [phone]                                    Esmer Churchill   MRN: [id number]    Department:   Emergency Department   Date of Visit:  2/26/2020           After Visit Summary Signature Page    I have received my discharge instructions, and my questions have been answered. I have discussed any challenges I see with this plan with the nurse or doctor.    ..........................................................................................................................................  Patient/Patient Representative Signature      ..........................................................................................................................................  Patient Representative Print Name and Relationship to Patient    ..................................................               ................................................  Date                                   Time    ..........................................................................................................................................  Reviewed by Signature/Title    ...................................................              ..............................................  Date                                               Time          22EPIC Rev 08/18

## 2020-02-26 NOTE — ED PROVIDER NOTES
"  History     Chief Complaint:  Nausea, Vomiting, and Diarrhea    The history is provided by the patient.      Esmer Churchill is a 59 year old female who presents with nausea, vomiting, and diarrhea. The patient reports having nausea, vomiting, and diarrhea for the last 10 hours. She denies any illness exposures. She had 2 chicken breasts from [employer] last night, one grilled and one fried, but states that it tasted fine. She has some right sided abdominal pain. She denies any chest pain. No urinary symptoms. No chest pain.    Allergies:  Oxycodone     Medications:    Albuterol  Hydrochlorothiazide  Lisinopril  Prilosec  Zantac  Oxybutynin chloride     Past Medical History:    Cerebral infarction  GERD  Hypertension  Impaired glucose tolerance  Spinal stenosis  Weakness in both legs  Bursitis hip    Past Surgical History:     section  Kidney stone surgery    Family History:    Diabetes    Social History:  Patient is   Tobacco Use: Former smoker  Alcohol Use: No  [facility]     Review of Systems   Constitutional: Negative for fever.   Cardiovascular: Negative for chest pain.   Gastrointestinal: Positive for abdominal pain, diarrhea, nausea and vomiting.   All other systems reviewed and are negative.    Physical Exam   First Vitals:  Patient Vitals for the past 24 hrs:   BP Temp Temp src Pulse Heart Rate Resp SpO2 Height Weight   20 1110 -- -- -- -- 106 -- -- -- --   20 1025 -- -- -- 105 -- -- -- -- --   20 0945 (!) 130/91 96.8  F (36  C) Temporal 115 -- 16 99 % 1.554 m (5' 1.2\") 75.3 kg (166 lb)     Physical Exam  Physical Exam   General:  Sitting on bed.   HENT:  No obvious trauma to head  Right Ear:  External ear normal.   Left Ear:  External ear normal.   Nose:  Nose normal.   Eyes:  Conjunctivae and EOM are normal. Pupils are equal, round, and reactive.   Neck: Normal range of motion. Neck supple. No tracheal deviation present.   CV:  Normal heart " sounds. No murmur heard.  Pulm/Chest: Effort normal and breath sounds normal.   Abd: Soft. No distension. Mild diffuse tenderness, more so on the right lower quadrant. There is no rigidity, no rebound and no guarding.   M/S: Normal range of motion.   Neuro: Alert. GCS 15.  Skin: Skin is warm and dry. No rash noted. Not diaphoretic.   Psych: Normal mood and affect. Behavior is normal.       Emergency Department Course     Laboratory:  CBC:  WBC 6.5, HGB 14.7,   CMP: Glucose 104 high, o/w WNL. (Creatinine 0.78)  Lipase: 79    Interventions:  1007: NS 1L IV  1009: Zofran 4mg IV    Emergency Department Course:  10:01 AM Nursing notes and vitals reviewed.  I performed an exam of the patient as documented above.     11:05 AM I reexamined the patient.  She is feeling significantly better.  I reexamined the abdomen.  She has no tenderness, guarding or rigidity.  Specifically, she has no right lower quadrant tenderness on exam.    Findings and plan explained to the patient. Patient discharged home with instructions regarding supportive care, medications, and reasons to return. The importance of close follow-up was reviewed. The patient was prescribed Zofran.    Impression & Plan    Medical Decision Making:  Esmer Churchill is a very pleasant 59 year old year old patient who presents to the emergency department with concern of nausea, vomiting and diarrhea.  The patient ate both grilled and fried chicken from [employer] yesterday.  A few hours after that she started to feel queasy, had emesis and some loose stools.  The patient did have very slight abdominal tenderness diffusely.  I considered appendicitis, cholecystitis, diverticulitis, etc.  The patient had unremarkable labs.  She received fluids and Zofran.  I reexamined her.  The patient is feeling significantly better and she has no pain on repeat exam.  My clinical suspicion for appendicitis is very low.  I discussed if she has any recurrent or increased pain or  fever that she should return immediately.  She has additional food that she had last night that she will throw when she gets home.  The patient is in agreement with this treatment plan    The treatment plan was discussed with the patient and they expressed understanding of this plan and consented to the plan.  In addition, the patient will return to the emergency department if their symptoms persist, worsen, if new symptoms arise or if there is any concern as other pathology may be present that is not evident at this time. They also understand the importance of close follow up in the clinic and if unable to do so will return to the emergency department for a reevaluation. All questions were answered.    Diagnosis:    ICD-10-CM    1. Nausea vomiting and diarrhea R11.2     R19.7    2. Abdominal pain, unspecified abdominal location R10.9        Disposition:  discharged to home    Discharge Medications:  New Prescriptions    ONDANSETRON (ZOFRAN ODT) 4 MG ODT TAB    Take 1 tablet (4 mg) by mouth every 8 hours as needed for nausea     I, Bradley Aasen, am serving as a scribe on 2/26/2020 at 10:01 AM to personally document services performed by Ramirez Rios DO based on my observations and the provider's statements to me.        Ramirez Rios DO  02/26/20 1113

## 2021-04-29 ENCOUNTER — HOSPITAL ENCOUNTER (EMERGENCY)
Facility: CLINIC | Age: 61
Discharge: HOME | End: 2021-04-29
Attending: PHYSICIAN ASSISTANT | Admitting: PHYSICIAN ASSISTANT
Payer: COMMERCIAL

## 2021-04-29 ENCOUNTER — APPOINTMENT (OUTPATIENT)
Dept: MRI IMAGING | Facility: CLINIC | Age: 61
End: 2021-04-29
Attending: PHYSICIAN ASSISTANT
Payer: COMMERCIAL

## 2021-04-29 VITALS
RESPIRATION RATE: 18 BRPM | BODY MASS INDEX: 31.37 KG/M2 | HEART RATE: 87 BPM | TEMPERATURE: 97.8 F | SYSTOLIC BLOOD PRESSURE: 123 MMHG | HEIGHT: 61 IN | DIASTOLIC BLOOD PRESSURE: 70 MMHG | OXYGEN SATURATION: 99 %

## 2021-04-29 DIAGNOSIS — R42 VERTIGO: ICD-10-CM

## 2021-04-29 DIAGNOSIS — R51.9 HEADACHE: ICD-10-CM

## 2021-04-29 LAB
ANION GAP SERPL CALCULATED.3IONS-SCNC: 1 MMOL/L (ref 3–14)
BASOPHILS # BLD AUTO: 0 10E9/L (ref 0–0.2)
BASOPHILS NFR BLD AUTO: 0.6 %
BUN SERPL-MCNC: 18 MG/DL (ref 7–30)
CALCIUM SERPL-MCNC: 9.7 MG/DL (ref 8.5–10.1)
CHLORIDE SERPL-SCNC: 110 MMOL/L (ref 94–109)
CO2 SERPL-SCNC: 30 MMOL/L (ref 20–32)
CREAT SERPL-MCNC: 0.8 MG/DL (ref 0.52–1.04)
CRP SERPL-MCNC: <2.9 MG/L (ref 0–8)
DIFFERENTIAL METHOD BLD: ABNORMAL
EOSINOPHIL # BLD AUTO: 0.1 10E9/L (ref 0–0.7)
EOSINOPHIL NFR BLD AUTO: 1.4 %
ERYTHROCYTE [DISTWIDTH] IN BLOOD BY AUTOMATED COUNT: 16.5 % (ref 10–15)
ERYTHROCYTE [SEDIMENTATION RATE] IN BLOOD BY WESTERGREN METHOD: 16 MM/H (ref 0–30)
GFR SERPL CREATININE-BSD FRML MDRD: 79 ML/MIN/{1.73_M2}
GLUCOSE SERPL-MCNC: 64 MG/DL (ref 70–99)
HCT VFR BLD AUTO: 40.8 % (ref 35–47)
HGB BLD-MCNC: 12.7 G/DL (ref 11.7–15.7)
IMM GRANULOCYTES # BLD: 0 10E9/L (ref 0–0.4)
IMM GRANULOCYTES NFR BLD: 0.4 %
LABORATORY COMMENT REPORT: NORMAL
LYMPHOCYTES # BLD AUTO: 1.7 10E9/L (ref 0.8–5.3)
LYMPHOCYTES NFR BLD AUTO: 33 %
MCH RBC QN AUTO: 28.8 PG (ref 26.5–33)
MCHC RBC AUTO-ENTMCNC: 31.1 G/DL (ref 31.5–36.5)
MCV RBC AUTO: 93 FL (ref 78–100)
MONOCYTES # BLD AUTO: 0.6 10E9/L (ref 0–1.3)
MONOCYTES NFR BLD AUTO: 11.1 %
NEUTROPHILS # BLD AUTO: 2.8 10E9/L (ref 1.6–8.3)
NEUTROPHILS NFR BLD AUTO: 53.5 %
NRBC # BLD AUTO: 0 10*3/UL
NRBC BLD AUTO-RTO: 0 /100
PLATELET # BLD AUTO: 222 10E9/L (ref 150–450)
POTASSIUM SERPL-SCNC: 3.4 MMOL/L (ref 3.4–5.3)
RBC # BLD AUTO: 4.41 10E12/L (ref 3.8–5.2)
SARS-COV-2 RNA RESP QL NAA+PROBE: NEGATIVE
SODIUM SERPL-SCNC: 141 MMOL/L (ref 133–144)
SPECIMEN SOURCE: NORMAL
WBC # BLD AUTO: 5.2 10E9/L (ref 4–11)

## 2021-04-29 PROCEDURE — 250N000013 HC RX MED GY IP 250 OP 250 PS 637: Performed by: PHYSICIAN ASSISTANT

## 2021-04-29 PROCEDURE — 86140 C-REACTIVE PROTEIN: CPT | Performed by: PHYSICIAN ASSISTANT

## 2021-04-29 PROCEDURE — 70553 MRI BRAIN STEM W/O & W/DYE: CPT

## 2021-04-29 PROCEDURE — 99285 EMERGENCY DEPT VISIT HI MDM: CPT | Mod: 25

## 2021-04-29 PROCEDURE — 85652 RBC SED RATE AUTOMATED: CPT | Performed by: PHYSICIAN ASSISTANT

## 2021-04-29 PROCEDURE — 80048 BASIC METABOLIC PNL TOTAL CA: CPT | Performed by: PHYSICIAN ASSISTANT

## 2021-04-29 PROCEDURE — 87635 SARS-COV-2 COVID-19 AMP PRB: CPT | Performed by: PHYSICIAN ASSISTANT

## 2021-04-29 PROCEDURE — 96374 THER/PROPH/DIAG INJ IV PUSH: CPT | Mod: 59

## 2021-04-29 PROCEDURE — 255N000002 HC RX 255 OP 636: Performed by: PHYSICIAN ASSISTANT

## 2021-04-29 PROCEDURE — A9585 GADOBUTROL INJECTION: HCPCS | Performed by: PHYSICIAN ASSISTANT

## 2021-04-29 PROCEDURE — C9803 HOPD COVID-19 SPEC COLLECT: HCPCS

## 2021-04-29 PROCEDURE — 85025 COMPLETE CBC W/AUTO DIFF WBC: CPT | Performed by: PHYSICIAN ASSISTANT

## 2021-04-29 PROCEDURE — 250N000011 HC RX IP 250 OP 636: Performed by: PHYSICIAN ASSISTANT

## 2021-04-29 RX ORDER — ACETAMINOPHEN 500 MG
1000 TABLET ORAL ONCE
Status: COMPLETED | OUTPATIENT
Start: 2021-04-29 | End: 2021-04-29

## 2021-04-29 RX ORDER — METOCLOPRAMIDE HYDROCHLORIDE 5 MG/ML
5 INJECTION INTRAMUSCULAR; INTRAVENOUS ONCE
Status: COMPLETED | OUTPATIENT
Start: 2021-04-29 | End: 2021-04-29

## 2021-04-29 RX ORDER — GADOBUTROL 604.72 MG/ML
7 INJECTION INTRAVENOUS ONCE
Status: COMPLETED | OUTPATIENT
Start: 2021-04-29 | End: 2021-04-29

## 2021-04-29 RX ADMIN — ACETAMINOPHEN 1000 MG: 500 TABLET, FILM COATED ORAL at 14:57

## 2021-04-29 RX ADMIN — GADOBUTROL 7 ML: 604.72 INJECTION INTRAVENOUS at 17:20

## 2021-04-29 RX ADMIN — METOCLOPRAMIDE 5 MG: 5 INJECTION, SOLUTION INTRAMUSCULAR; INTRAVENOUS at 14:58

## 2021-04-29 ASSESSMENT — ENCOUNTER SYMPTOMS
DIZZINESS: 1
HEADACHES: 1

## 2021-04-29 NOTE — ED PROVIDER NOTES
"  History   Chief Complaint:  Headache      HPI   Esmer Churchill is a 60 year old female with history of stroke who presents with headache. The patient states that she received the second Moderna covid vaccine 8 days ago and then developed a headache the next day. This was gradual in onset.  She also developed intermittent dizziness, blurry vision in the right eye, and stiffness in her right hand since receiving the shot. The vision problems have resolved and she has no visual complaints at present, but the dizziness and hand issues have persisted.  She describes the dizziness as feeling off balance and has been causing her to stumble while walking. She denies any fever or vision loss/visual field cuts. She is not on blood thinners. She denies a history of headache. She has had no fever or neck stiffness.  No one around her has similar symptoms.  No chest pain or SOB.  No history of DVT or PE.      Review of Systems   Eyes: Positive for visual disturbance (right eye blurry).   Neurological: Positive for dizziness and headaches.   All other systems reviewed and are negative.       Allergies:  No Known Allergies    Medications:  hydrochlorothiazide   lisinopril   omeprazole   oxybutynin chloride     Past Medical History:    Cerebral infarction   GERD (gastroesophageal reflux disease)   Hypertension   Impaired glucose tolerance   Spinal stenosis     Past Surgical History:       Kidney stone surgery     Family History:    Diabetes     Social History:  Patient presents alone.     Physical Exam     Patient Vitals for the past 24 hrs:   BP Temp Temp src Pulse Resp SpO2 Height   21 1313 123/70 97.8  F (36.6  C) Temporal 87 18 99 % 1.549 m (5' 1\")       Physical Exam  General: Awake, alert, pleasant, non-toxic.  Head:  Scalp is NC/AT  Eyes:  Conjunctiva normal, PERRL  ENT:  The external nose and ears are normal.     TM's normal BL, no mastoid swelling or tenderness  Neck:  Normal range of motion without " rigidity.  CV:  Regular rate and rhythm    No pathologic murmur, rubs, or gallops.  Resp:  Breath sounds are clear bilaterally.  No crackles, wheezes, rhonchi, stridor.    Non-labored, no retractions or accessory muscle use  Abdomen: Abdomen is soft, no distension, no tenderness, no masses. No CVA tenderness.  MS:  No lower extremity edema or asymmetric calf swelling. Normal ROM in all joints without effusions.    No midline cervical, thoracic, or lumbar tenderness  Skin:  Warm and dry, No rash or lesions noted. 2+ peripheral pulses in all extremities  Neuro:  Alert and oriented x3.  No gross motor deficits.  No facial asymmetry.  5/5 strength BL in UE and LE, normal sensation to touch.  Cranial nerves 2-12 intact.  Normal finger to nose and heel to shin testing.  Gait normal  Psych: Awake. Alert. Normal affect. Appropriate interactions.  \    Emergency Department Course       Imaging:  MR Brain w/o & w Contrast   Final Result   IMPRESSION: Diffuse cerebral volume loss and cerebral white matter   changes consistent with chronic small vessel ischemic disease. No   evidence for acute intracranial pathology.      CELESTINO MOSHER MD          Laboratory:  CBC:  WBC 5.2, HGB 12.7, , o/w WNL      BMP: Glucose 64 (H), chloride 110 (H), anion gap 1 (L), o/w WNL (Creatinine: 0.80)     CRP inflammation: <2.9    Erythrocyte sedimentation rate auto: 16    Emergency Department Course:    Reviewed:  I reviewed the patient's nursing notes, vitals, past medical records, Care Everywhere.     Assessments:  1422  I performed an exam of the patient as documented above.   1820 I re-checked the pt.  Symptoms are improved and pt ambulates well.     Interventions:  Medications   metoclopramide (REGLAN) injection 5 mg (5 mg Intravenous Given 4/29/21 1458)   acetaminophen (TYLENOL) tablet 1,000 mg (1,000 mg Oral Given 4/29/21 1457)   gadobutrol (GADAVIST) injection 7 mL (7 mLs Intravenous Given 4/29/21 1720)  "      Disposition:  Discharged to home.      Impression & Plan     Medical Decision Making:  Esmer Churchill is a 60 year old female who presents with a headache.  Evaluation in the emergency department has been negative. MRI without acute pathology.  ESR and CRP normal.  The patient has not had any fever,  neck stiffness, thunderclap, \"worst ever\" character, seizures, ongoing vision changes, trauma, or confusion. There is no evidence of increased ICP.  SAH, stroke, cervical artery dissection, intracranial hemorrhage, meningitis/abscess, CVT, tumor, pseudotumor cerebri, PRES, pituitary apoplexy, glaucoma, temporal arteritis, CO poisoning are considered as part of the differential, and considered unlikely.  Pain has improved with medication interventions.  She ambulated without difficulty.  No vertigo at this time and no evidence of inner ear pathology.  Blood work unremarkable.    The patient should follow-up with primary physician within 3 days. If the headache continues or the frequency increases, outpatient consultation with neurology will be indicated.  I recommended return for worse pain, fever, vomiting, weakness, vision changes, neck stiffness, or any other concerns.          Diagnosis:    ICD-10-CM    1. Headache  R51.9 Asymptomatic SARS-CoV-2 COVID-19 Virus (Coronavirus) by PCR   2. Vertigo  R42        Discharge Medications:  Discharge Medication List as of 4/29/2021  6:21 PM          Scribe Disclosure:  I, Apolinar Wing, am serving as a scribe at 2:22 PM on 4/29/2021 to document services personally performed by Chidi Turner PA based on my observations and the provider's statements to me.        Chdii Turner PA-C  04/29/21 2211    "

## 2021-04-29 NOTE — DISCHARGE INSTRUCTIONS
Discharge Instructions  Headache    You were seen today for a headache. Headaches may be caused by many different things such as muscle tension, sinus inflammation, anxiety and stress, having too little sleep, too much alcohol, some medical conditions or injury. You may have a migraine, which is caused by changes in the blood vessels in your head.  At this time your provider does not find that your headache is a sign of anything dangerous or life-threatening.  However, sometimes the signs of serious illness do not show up right away.      Generally, every Emergency Department visit should have a follow-up clinic visit with either a primary or a specialty clinic/provider. Please follow-up as instructed by your emergency provider today.    Return to the Emergency Department if:  You get a new fever of 100.4 F or higher.  Your headache gets much worse.  You get a stiff neck with your headache.  You get a new headache that is significantly different or worse than headaches you have had before.  You are vomiting (throwing up) and cannot keep food or water down.  You have blurry or double vision or other problems with your eyes.  You have a new weakness on one side of your body.  You have difficulty with balance which is new.  You or your family thinks you are confused.  You have a seizure.    What can I do to help myself?  Pain medications - You may take a pain medication such as Tylenol  (acetaminophen), Advil , Motrin  (ibuprofen) or Aleve  (naproxen).  Take a pain reliever as soon as you notice symptoms.  Starting medications as soon as you start to have symptoms may lessen the amount of pain you have.  Relaxing in a quiet, dark room may help.  Get enough sleep and eat meals regularly.  You may need to watch for certain foods or other things which may trigger your headaches.  Keeping a journal of your headaches and possible triggers may help you and your primary provider to identify things which you should avoid which  may be causing your headaches.  If you were given a prescription for medicine here today, be sure to read all of the information (including the package insert) that comes with your prescription.  This will include important information about the medicine, its side effects, and any warnings that you need to know about.  The pharmacist who fills the prescription can provide more information and answer questions you may have about the medicine.  If you have questions or concerns that the pharmacist cannot address, please call or return to the Emergency Department.   Remember that you can always come back to the Emergency Department if you are not able to see your regular provider in the amount of time listed above, if you get any new symptoms, or if there is anything that worries you.  Discharge Instructions  Vertigo  You have been diagnosed with vertigo.  This is a dizzy feeling often described as spinning or that the room is moving around you. You will often have nausea (sick to your stomach), vomiting (throwing up), and balance problems with it.  Vertigo is usually caused by a problem in the inner ear which helps control your balance.  Many things can cause vertigo, including calcium collections in the inner ear, a virus infection of the inner ear, concussion, migraine, and some medicines.  Luckily, these causes are not life threatening and will eventually go away.  However, sometimes there is a serious problem that does not show up right away.  Generally, every Emergency Department visit should have a follow-up clinic visit with either a primary or a specialty clinic/provider. Please follow-up as instructed by your emergency provider today.  Return to the Emergency Department if you have:  New or severe headache.  Double vision (seeing two of things).  Trouble speaking or hearing.  Weakness or trouble moving/using one side of your body.  Passing out.  Numbness or tingling.  Chest pain.  Vomiting that will not  stop.    Treatment:  There are several commonly prescribed medications:  Antihistamines such as meclizine (Antivert ), dimenhydrinate (Dramamine ), or diphenhydramine (Benadryl ).  Prescription anti-nausea medicines, such as promethazine (Phenergan ), metoclopramide (Reglan ), or ondansetron (Zofran ).  Prescription sedative medicines, such as diazepam (Valium ), lorazepam (Ativan ), or clonazepam (Klonopin ).  Most of these medicines make you sleepy, and you should not take them before you work or drive. You should only take prescription medicines to treat severe vertigo symptoms, and you should stop the medicine when your symptoms improve.    Follow Up:  If you have vertigo longer than three days, it is important that you follow up either with your primary provider or an Ear, Nose, and Throat (ENT) specialist.  You may need further testing to evaluate your vertigo and you may also need  vestibular  therapy which is a special form of physical therapy to make the vertigo go away.    If you were given a prescription for medicine here today, be sure to read all of the information (including the package insert) that comes with your prescription.  This will include important information about the medicine, its side effects, and any warnings that you need to know about.  The pharmacist who fills the prescription can provide more information and answer questions you may have about the medicine.  If you have questions or concerns that the pharmacist cannot address, please call or return to the Emergency Department.     Remember that you can always come back to the Emergency Department if you are not able to see your regular provider in the amount of time listed above, if you get any new symptoms, or if there is anything that worries you.

## 2022-11-11 PROCEDURE — 99283 EMERGENCY DEPT VISIT LOW MDM: CPT

## 2022-11-12 ENCOUNTER — HOSPITAL ENCOUNTER (EMERGENCY)
Facility: CLINIC | Age: 62
Discharge: HOME | End: 2022-11-12
Attending: EMERGENCY MEDICINE | Admitting: EMERGENCY MEDICINE
Payer: COMMERCIAL

## 2022-11-12 VITALS
WEIGHT: 175 LBS | RESPIRATION RATE: 18 BRPM | SYSTOLIC BLOOD PRESSURE: 149 MMHG | OXYGEN SATURATION: 100 % | TEMPERATURE: 98.7 F | DIASTOLIC BLOOD PRESSURE: 99 MMHG | HEART RATE: 78 BPM | BODY MASS INDEX: 33.04 KG/M2 | HEIGHT: 61 IN

## 2022-11-12 DIAGNOSIS — J12.82 PNEUMONIA DUE TO 2019 NOVEL CORONAVIRUS: ICD-10-CM

## 2022-11-12 DIAGNOSIS — U07.1 PNEUMONIA DUE TO 2019 NOVEL CORONAVIRUS: ICD-10-CM

## 2022-11-12 DIAGNOSIS — J45.909 ASTHMA, UNSPECIFIED ASTHMA SEVERITY, UNSPECIFIED WHETHER COMPLICATED, UNSPECIFIED WHETHER PERSISTENT: ICD-10-CM

## 2022-11-12 LAB
ANION GAP SERPL CALCULATED.3IONS-SCNC: 7 MMOL/L (ref 3–14)
BASOPHILS # BLD AUTO: 0 10E3/UL (ref 0–0.2)
BASOPHILS NFR BLD AUTO: 0 %
BUN SERPL-MCNC: 16 MG/DL (ref 7–30)
CALCIUM SERPL-MCNC: 9.5 MG/DL (ref 8.5–10.1)
CHLORIDE BLD-SCNC: 112 MMOL/L (ref 94–109)
CO2 SERPL-SCNC: 23 MMOL/L (ref 20–32)
CREAT SERPL-MCNC: 0.59 MG/DL (ref 0.52–1.04)
EOSINOPHIL # BLD AUTO: 0.1 10E3/UL (ref 0–0.7)
EOSINOPHIL NFR BLD AUTO: 2 %
ERYTHROCYTE [DISTWIDTH] IN BLOOD BY AUTOMATED COUNT: 16.1 % (ref 10–15)
GFR SERPL CREATININE-BSD FRML MDRD: >90 ML/MIN/1.73M2
GLUCOSE BLD-MCNC: 102 MG/DL (ref 70–99)
HCT VFR BLD AUTO: 37.9 % (ref 35–47)
HGB BLD-MCNC: 12.3 G/DL (ref 11.7–15.7)
HOLD SPECIMEN: NORMAL
IMM GRANULOCYTES # BLD: 0 10E3/UL
IMM GRANULOCYTES NFR BLD: 0 %
LACTATE SERPL-SCNC: 0.3 MMOL/L (ref 0.7–2)
LYMPHOCYTES # BLD AUTO: 1.4 10E3/UL (ref 0.8–5.3)
LYMPHOCYTES NFR BLD AUTO: 48 %
MCH RBC QN AUTO: 28.3 PG (ref 26.5–33)
MCHC RBC AUTO-ENTMCNC: 32.5 G/DL (ref 31.5–36.5)
MCV RBC AUTO: 87 FL (ref 78–100)
MONOCYTES # BLD AUTO: 0.4 10E3/UL (ref 0–1.3)
MONOCYTES NFR BLD AUTO: 15 %
NEUTROPHILS # BLD AUTO: 1 10E3/UL (ref 1.6–8.3)
NEUTROPHILS NFR BLD AUTO: 35 %
NRBC # BLD AUTO: 0 10E3/UL
NRBC BLD AUTO-RTO: 0 /100
PLATELET # BLD AUTO: 176 10E3/UL (ref 150–450)
POTASSIUM BLD-SCNC: 3.4 MMOL/L (ref 3.4–5.3)
RBC # BLD AUTO: 4.34 10E6/UL (ref 3.8–5.2)
SODIUM SERPL-SCNC: 142 MMOL/L (ref 133–144)
WBC # BLD AUTO: 2.8 10E3/UL (ref 4–11)

## 2022-11-12 PROCEDURE — 80048 BASIC METABOLIC PNL TOTAL CA: CPT | Performed by: EMERGENCY MEDICINE

## 2022-11-12 PROCEDURE — 85025 COMPLETE CBC W/AUTO DIFF WBC: CPT | Performed by: EMERGENCY MEDICINE

## 2022-11-12 PROCEDURE — 36415 COLL VENOUS BLD VENIPUNCTURE: CPT | Performed by: EMERGENCY MEDICINE

## 2022-11-12 PROCEDURE — 87040 BLOOD CULTURE FOR BACTERIA: CPT | Performed by: EMERGENCY MEDICINE

## 2022-11-12 PROCEDURE — 83605 ASSAY OF LACTIC ACID: CPT | Performed by: EMERGENCY MEDICINE

## 2022-11-12 RX ORDER — ALBUTEROL SULFATE 90 UG/1
2 AEROSOL, METERED RESPIRATORY (INHALATION) EVERY 6 HOURS PRN
Qty: 8 G | Refills: 0 | Status: SHIPPED | OUTPATIENT
Start: 2022-11-12 | End: 2022-11-22

## 2022-11-12 ASSESSMENT — ACTIVITIES OF DAILY LIVING (ADL): ADLS_ACUITY_SCORE: 33

## 2022-11-12 NOTE — ED PROVIDER NOTES
History   Chief Complaint:  Covid Concern     HPI   Esmer Churchill is a 62 year old female with history of asthma and hypertension who presents with symptoms of COVID infection and COVID-pneumonia as well as concerns about low pulse oximetry at home.  She did not bring the home oximeter with her but reports that it was saturating in the high 80s at home.  She first got symptoms 5 days ago.  She had testing 4 days ago and the results returned 3 days ago.  She was started on Paxlovid and is taken a full days worth of Paxlovid (2 doses).  She continues to have headache, muscle ache, cough, shortness of breath and nausea.  She feels like she is been wheezing quite a bit at home.  She had a CT scan done yesterday at an outside facility which showed no signs of pulmonary embolism but did show some signs of COVID-pneumonia.  No nausea or vomiting.She reports that she is fully vaccinated against COVID.    Chest -- Computed Tomography   Lung -- --   Vascular -- --       Impression    COMPARISON: None.     TECHNIQUE: Images were obtained through the chest following the administration of 62 mL IOPAMIDOL 76 % IV SOLN contrast using a pulmonary embolus protocol.     FINDINGS: The contrast bolus is adequate. No evidence of a pulmonary embolus.     There are ill-defined groundglass and reticular opacities that are predominantly in the periphery of the lower lobes. No dense lung consolidation. No pleural effusion. No adenopathy. Limited visualization of the upper abdomen. There is a 1.2 cm nonobstructing stone in the visualized right kidney.     IMPRESSION:     1. No evidence for a pulmonary embolus.   2. Ill-defined bilateral lung opacities as described. Differential considerations could include subsegmental atelectasis and/or an infectious process; given the pandemic, pneumonia due to COVID-19 could be included in the differential.       Review of Systems  As noted per HPI.  Remainder of a 10 point review of systems was  "negative.    Allergies:  Oxycodone  Naproxen   Oxycodone-Acetaminophen     Medications:  Albuterol   Lisinopril   Ativan   Flexeril   Detrolla  Protonix  Amlodipine   Paxlovid    Past Medical History:     Bursitis   Lower extremity weakness   Asthma   GERD   Renal stones   Pinguecula   Cataract   Flat foot   Posterior tibial tendinitis   Spinal stenosis   Prediabetes   Incontinence   Epistaxis   Enthesopathy   Nephrolithiasis   Hypertension   Gum disease    Past Surgical History:     section   Kidney stone surgery    Family History:    Diabetes   Hypertension   Cancer stomach   Liver cancer     Social History:  The patient presents to the ED alone.   Patient arrived to the ED via private vehicle.  [facility]: [facility]     Physical Exam     Patient Vitals for the past 24 hrs:   BP Temp Temp src Pulse Resp SpO2 Height Weight   22 0400 (!) 149/99 -- -- 78 -- 96 % -- --   22 0330 (!) 131/92 -- -- 78 -- 99 % -- --   22 0326 (!) 154/95 -- -- 80 -- 97 % -- --   22 0008 (!) 143/92 98.7  F (37.1  C) Oral 80 18 99 % 1.549 m (5' 1\") 79.4 kg (175 lb)   22 2353 -- -- -- -- -- -- -- 106.6 kg (235 lb)       Physical Exam  General: Well-nourished, appears to be uncomfortable when I enter the room  Eyes: PERRL, conjunctivae pink no scleral icterus or conjunctival injection  ENT:  Moist mucus membranes, posterior oropharynx mildly erythematous without exudates or edema  Respiratory:  Normal work of breathing. Speaking in complete sentences. No accessory muscle use.  +Cough  CV: Normal rate, regular pulse  GI:  Abdomen soft and non-distended.  No tenderness, guarding or rebound  Skin: Warm, dry.  No rashes or petechiae  Musculoskeletal: No peripheral edema or calf tenderness  Neuro: Alert and oriented to person/place/time.  Neck supple.  Psychiatric: Normal affect    Emergency Department Course     Laboratory:  Labs Ordered and Resulted from Time of ED Arrival to Time of ED " Departure   LACTIC ACID WHOLE BLOOD - Abnormal       Result Value    Lactic Acid 0.3 (*)    BASIC METABOLIC PANEL - Abnormal    Sodium 142      Potassium 3.4      Chloride 112 (*)     Carbon Dioxide (CO2) 23      Anion Gap 7      Urea Nitrogen 16      Creatinine 0.59      Calcium 9.5      Glucose 102 (*)     GFR Estimate >90     CBC WITH PLATELETS AND DIFFERENTIAL - Abnormal    WBC Count 2.8 (*)     RBC Count 4.34      Hemoglobin 12.3      Hematocrit 37.9      MCV 87      MCH 28.3      MCHC 32.5      RDW 16.1 (*)     Platelet Count 176      % Neutrophils 35      % Lymphocytes 48      % Monocytes 15      % Eosinophils 2      % Basophils 0      % Immature Granulocytes 0      NRBCs per 100 WBC 0      Absolute Neutrophils 1.0 (*)     Absolute Lymphocytes 1.4      Absolute Monocytes 0.4      Absolute Eosinophils 0.1      Absolute Basophils 0.0      Absolute Immature Granulocytes 0.0      Absolute NRBCs 0.0     BLOOD CULTURE      Emergency Department Course:       Reviewed:  I reviewed nursing notes, vitals, past medical history and Care Everywhere    Assessments:  0406 I obtained history and examined the patient as noted above. At this point I feel that the patient is safe for discharge, and the patient agrees.    Disposition:  The patient was discharged to home.     Impression & Plan     CMS Diagnoses: None    Medical Decision Making:  Esmer Churchill is a 62 year old female who was recently diagnosed with COVID-pneumonia and comes today for concern of low blood oxygen levels based on her home oximeter.  Since she has been in our emergency department she has had very good oxygen levels, hovering near 100.  She did not drop her pulse ox with exertion.  I suspect that the home pulse oximeter is discordant.  We provided her a new pulse oximeter from here which hopefully be more accurate.  I did discuss with her that her acrylic nails likely interfere and strategies to overcome this.  Laboratory studies reveal leukopenia  consistent with COVID.  No signs of bacterial superinfection.  Blood clot was ruled out yesterday when she had her CT scan.  There was no pericardial effusion at this time as well.  Patient was relieved to see these normal oxygen readings.  She was comfortable with the plan for discharge home.  At this time, with reasonable clinical confidence, I do believe she is safe for discharge home however she is asked to have a low threshold to return if any worsening.    Covid-19  Esmer Churchill was evaluated during a global COVID-19 pandemic, which necessitated consideration that the patient might be at risk for infection with the SARS-CoV-2 virus that caus     Harriet Breen MD  11/12/22 0578

## 2022-11-12 NOTE — DISCHARGE INSTRUCTIONS
*You should isolate at home until 10 days after the onset of symptoms or 3 days without any symptoms without any Tylenol or Motrin.  *Tylenol and/or Motrin as directed as needed for pain relief. Albuterol as directed as needed. Continue  the paxlovid. Continue your current medications.  *Follow-up virtually with your physician in the next week.    *Return if you develop heart rate above 120, pulse oximetry reading below 92%, worsening shortness of breath, faint or feel like you will faint or become worse in any way.

## 2022-11-12 NOTE — ED TRIAGE NOTES
Pt dx with pneumonia yesterday after testing positive for covid on Wednesday. Comes in because O2 sats were in the upper 80s at home. Feels more SOB today. Currently on paxlovid.

## 2022-11-17 LAB — BACTERIA BLD CULT: NO GROWTH

## 2023-01-29 ENCOUNTER — HOSPITAL ENCOUNTER (EMERGENCY)
Facility: CLINIC | Age: 63
Discharge: HOME | End: 2023-01-30
Attending: EMERGENCY MEDICINE | Admitting: EMERGENCY MEDICINE
Payer: COMMERCIAL

## 2023-01-29 ENCOUNTER — APPOINTMENT (OUTPATIENT)
Dept: CT IMAGING | Facility: CLINIC | Age: 63
End: 2023-01-29
Attending: EMERGENCY MEDICINE
Payer: COMMERCIAL

## 2023-01-29 DIAGNOSIS — Z87.442 HISTORY OF RENAL STONE: ICD-10-CM

## 2023-01-29 DIAGNOSIS — N12 PYELONEPHRITIS: ICD-10-CM

## 2023-01-29 LAB
ALBUMIN SERPL BCG-MCNC: 3.6 G/DL (ref 3.5–5.2)
ALBUMIN UR-MCNC: 100 MG/DL
ALP SERPL-CCNC: 75 U/L (ref 35–104)
ALT SERPL W P-5'-P-CCNC: 16 U/L (ref 10–35)
ANION GAP SERPL CALCULATED.3IONS-SCNC: 14 MMOL/L (ref 7–15)
APPEARANCE UR: ABNORMAL
AST SERPL W P-5'-P-CCNC: 16 U/L (ref 10–35)
BACTERIA #/AREA URNS HPF: ABNORMAL /HPF
BASOPHILS # BLD AUTO: 0 10E3/UL (ref 0–0.2)
BASOPHILS NFR BLD AUTO: 0 %
BILIRUB SERPL-MCNC: 0.3 MG/DL
BILIRUB UR QL STRIP: NEGATIVE
BUN SERPL-MCNC: 17.4 MG/DL (ref 8–23)
CALCIUM SERPL-MCNC: 9 MG/DL (ref 8.8–10.2)
CHLORIDE SERPL-SCNC: 102 MMOL/L (ref 98–107)
COLOR UR AUTO: ABNORMAL
CREAT SERPL-MCNC: 0.89 MG/DL (ref 0.51–0.95)
DEPRECATED HCO3 PLAS-SCNC: 23 MMOL/L (ref 22–29)
EOSINOPHIL # BLD AUTO: 0 10E3/UL (ref 0–0.7)
EOSINOPHIL NFR BLD AUTO: 0 %
ERYTHROCYTE [DISTWIDTH] IN BLOOD BY AUTOMATED COUNT: 15.7 % (ref 10–15)
GFR SERPL CREATININE-BSD FRML MDRD: 72 ML/MIN/1.73M2
GLUCOSE SERPL-MCNC: 94 MG/DL (ref 70–99)
GLUCOSE UR STRIP-MCNC: NEGATIVE MG/DL
HCT VFR BLD AUTO: 34.2 % (ref 35–47)
HGB BLD-MCNC: 10.6 G/DL (ref 11.7–15.7)
HGB UR QL STRIP: ABNORMAL
IMM GRANULOCYTES # BLD: 0 10E3/UL
IMM GRANULOCYTES NFR BLD: 0 %
KETONES UR STRIP-MCNC: NEGATIVE MG/DL
LACTATE SERPL-SCNC: 0.6 MMOL/L (ref 0.7–2)
LEUKOCYTE ESTERASE UR QL STRIP: ABNORMAL
LYMPHOCYTES # BLD AUTO: 1.5 10E3/UL (ref 0.8–5.3)
LYMPHOCYTES NFR BLD AUTO: 19 %
MCH RBC QN AUTO: 28.3 PG (ref 26.5–33)
MCHC RBC AUTO-ENTMCNC: 31 G/DL (ref 31.5–36.5)
MCV RBC AUTO: 91 FL (ref 78–100)
MONOCYTES # BLD AUTO: 1 10E3/UL (ref 0–1.3)
MONOCYTES NFR BLD AUTO: 13 %
MUCOUS THREADS #/AREA URNS LPF: PRESENT /LPF
NEUTROPHILS # BLD AUTO: 5.2 10E3/UL (ref 1.6–8.3)
NEUTROPHILS NFR BLD AUTO: 68 %
NITRATE UR QL: POSITIVE
NRBC # BLD AUTO: 0 10E3/UL
NRBC BLD AUTO-RTO: 0 /100
PH UR STRIP: 6 [PH] (ref 5–7)
PLATELET # BLD AUTO: 181 10E3/UL (ref 150–450)
POTASSIUM SERPL-SCNC: 3.6 MMOL/L (ref 3.4–5.3)
PROT SERPL-MCNC: 6.4 G/DL (ref 6.4–8.3)
RBC # BLD AUTO: 3.74 10E6/UL (ref 3.8–5.2)
RBC URINE: 61 /HPF
SODIUM SERPL-SCNC: 139 MMOL/L (ref 136–145)
SP GR UR STRIP: 1.01 (ref 1–1.03)
UROBILINOGEN UR STRIP-MCNC: NORMAL MG/DL
WBC # BLD AUTO: 7.7 10E3/UL (ref 4–11)
WBC URINE: >182 /HPF

## 2023-01-29 PROCEDURE — 87086 URINE CULTURE/COLONY COUNT: CPT | Performed by: EMERGENCY MEDICINE

## 2023-01-29 PROCEDURE — 83605 ASSAY OF LACTIC ACID: CPT | Performed by: EMERGENCY MEDICINE

## 2023-01-29 PROCEDURE — 96365 THER/PROPH/DIAG IV INF INIT: CPT

## 2023-01-29 PROCEDURE — 96361 HYDRATE IV INFUSION ADD-ON: CPT

## 2023-01-29 PROCEDURE — 85025 COMPLETE CBC W/AUTO DIFF WBC: CPT | Performed by: EMERGENCY MEDICINE

## 2023-01-29 PROCEDURE — 258N000003 HC RX IP 258 OP 636: Performed by: EMERGENCY MEDICINE

## 2023-01-29 PROCEDURE — 74176 CT ABD & PELVIS W/O CONTRAST: CPT

## 2023-01-29 PROCEDURE — 99285 EMERGENCY DEPT VISIT HI MDM: CPT | Mod: 25

## 2023-01-29 PROCEDURE — 80053 COMPREHEN METABOLIC PANEL: CPT | Performed by: EMERGENCY MEDICINE

## 2023-01-29 PROCEDURE — 250N000011 HC RX IP 250 OP 636: Performed by: EMERGENCY MEDICINE

## 2023-01-29 PROCEDURE — 87040 BLOOD CULTURE FOR BACTERIA: CPT | Performed by: EMERGENCY MEDICINE

## 2023-01-29 PROCEDURE — 36415 COLL VENOUS BLD VENIPUNCTURE: CPT | Performed by: EMERGENCY MEDICINE

## 2023-01-29 PROCEDURE — 81001 URINALYSIS AUTO W/SCOPE: CPT | Performed by: EMERGENCY MEDICINE

## 2023-01-29 RX ORDER — CEFTRIAXONE 2 G/1
2 INJECTION, POWDER, FOR SOLUTION INTRAMUSCULAR; INTRAVENOUS ONCE
Status: COMPLETED | OUTPATIENT
Start: 2023-01-29 | End: 2023-01-29

## 2023-01-29 RX ADMIN — SODIUM CHLORIDE 1000 ML: 9 INJECTION, SOLUTION INTRAVENOUS at 20:48

## 2023-01-29 RX ADMIN — CEFTRIAXONE SODIUM 2 G: 2 INJECTION, POWDER, FOR SOLUTION INTRAMUSCULAR; INTRAVENOUS at 22:54

## 2023-01-29 ASSESSMENT — ENCOUNTER SYMPTOMS
ABDOMINAL PAIN: 1
FREQUENCY: 1
FEVER: 1
NAUSEA: 0
VOMITING: 0

## 2023-01-29 ASSESSMENT — ACTIVITIES OF DAILY LIVING (ADL): ADLS_ACUITY_SCORE: 35

## 2023-01-30 VITALS
OXYGEN SATURATION: 99 % | RESPIRATION RATE: 18 BRPM | TEMPERATURE: 97.9 F | DIASTOLIC BLOOD PRESSURE: 78 MMHG | WEIGHT: 173 LBS | SYSTOLIC BLOOD PRESSURE: 111 MMHG | HEART RATE: 87 BPM | BODY MASS INDEX: 32.69 KG/M2

## 2023-01-30 RX ORDER — CIPROFLOXACIN 500 MG/1
500 TABLET, FILM COATED ORAL 2 TIMES DAILY
Qty: 14 TABLET | Refills: 0 | Status: SHIPPED | OUTPATIENT
Start: 2023-01-30 | End: 2023-02-06

## 2023-01-30 NOTE — ED PROVIDER NOTES
Called by [facility] pharmacy requesting confirmation of prescription.  I reviewed Dr. Middleton's note and confirmed that the prescription was for prophylaxis given 500 mg p.o. twice daily for 7 days.  Pharmacist said they would continue to fill this prescription.     Genesis Valdez MD  01/30/23 0930

## 2023-01-30 NOTE — ED PROVIDER NOTES
History     Chief Complaint:  Fever, Urinary Frequency, and Abdominal Pain    The history is provided by the patient.      Esmer Churchill is a 62 year old female with a history of hypertension and recent stent removal who presents with fever, urinary frequency, and abdominal pain. Patient provides that today at 0300, she woke up with a fever and the urinary frequency. This has continued throughout the day and her highest temperature was 102.4. She has been taking Tylenol to manage the fever. She additionally notes that she has had a right lateral abdominal pain, around the area where she recently had a kidney stone and stent placement. She had the stent removed on the . Patient denies any nausea or vomiting.  No visible hematuria.  No respiratory symptoms.    Independent Historian: See above HPI for details.     Review of External Notes:  Urology notes including office visit  and triage call    ROS:  Review of Systems   Constitutional: Positive for fever.   Gastrointestinal: Positive for abdominal pain (right sided). Negative for nausea and vomiting.   Genitourinary: Positive for frequency.   All other systems reviewed and are negative.      Allergies:  Oxycodone   Naproxen    Medications:    Albuterol  Hydrochlorothiazide  Lisinopril  Prilosec  Zantac  Protonix  Norvasc  Drisdol  Detrolla  Flomax  Toradol  Ditropan  Tessalon    Past Medical History:    Cerebral infarction  GERD  Hypertension  Impaired glucose tolerance  Spinal stenosis  Asthma  Bursitis of both hips  Renal stone  Pinguecula  Posterior tibial tendinitis  Lumbar spinal stenosis  Prediabetes  Obesity    Past Surgical History:     section  Kidney stone surgery    Family History:    Diabetes  Stomach cancer  Liver cancer  Type II diabetes  Hypertension  Thyroid cancer    Social History:  Patient came from home.  Patient is accompanied in the ED.  PCP: Kalee [facility]     Physical Exam     Patient Vitals for the past  24 hrs:   BP Temp Temp src Pulse Resp SpO2 Weight   01/29/23 2330 105/68 -- -- 84 -- 93 % --   01/29/23 2300 107/68 -- -- 88 -- 95 % --   01/29/23 2253 -- -- -- -- -- 97 % --   01/29/23 2252 104/73 -- -- 91 -- -- --   01/29/23 2100 -- 97.9  F (36.6  C) Oral -- -- -- --   01/29/23 2036 135/77 100.2  F (37.9  C) Oral 115 18 99 % 78.5 kg (173 lb)        Physical Exam  Eyes:               Sclera white; Pupils are equal and round  ENT:                External ears and nares normal  CV:                  Rate as above with regular rhythm   Resp:               Breath sounds clear and equal bilaterally                          Non-labored, no retractions or accessory muscle use  GI:                   Abdomen is soft, non-tender, non-distended                          No rebound tenderness or peritoneal features  :                  Mild R CVA tenderness  MS:                  Moves all extremities  Skin:                Warm and dry  Neuro:             Speech is normal and fluent. No apparent deficit.    Emergency Department Course     Imaging:  CT Abdomen Pelvis w/o Contrast   Final Result   IMPRESSION:    1. There is minimal right hydronephrosis and some nonspecific periureteral stranding and edema seen along the course of the right ureter. No ureteral stones or definite obstructing process is seen. By history, this patient has had recent stent placement    and removal. Correlation with prior workup in this area. Findings may relate to reactive inflammatory changes from stent removal. An obstructing process not visible on CT such as a blood clot within the ureter accounting for this appearance cannot be    excluded. Recommend urologic consultation for further evaluation and management.   2. Otherwise no acute findings elsewhere in the abdomen or pelvis.   3. Multiple nonobstructing intrarenal calculi in the right kidney. No urinary calculi on the left.   4. No acute bowel findings. Colonic diverticulosis without evidence  for diverticulitis.         Report per radiology    Laboratory:  Labs Ordered and Resulted from Time of ED Arrival to Time of ED Departure   CBC WITH PLATELETS AND DIFFERENTIAL - Abnormal       Result Value    WBC Count 7.7      RBC Count 3.74 (*)     Hemoglobin 10.6 (*)     Hematocrit 34.2 (*)     MCV 91      MCH 28.3      MCHC 31.0 (*)     RDW 15.7 (*)     Platelet Count 181      % Neutrophils 68      % Lymphocytes 19      % Monocytes 13      % Eosinophils 0      % Basophils 0      % Immature Granulocytes 0      NRBCs per 100 WBC 0      Absolute Neutrophils 5.2      Absolute Lymphocytes 1.5      Absolute Monocytes 1.0      Absolute Eosinophils 0.0      Absolute Basophils 0.0      Absolute Immature Granulocytes 0.0      Absolute NRBCs 0.0     LACTIC ACID WHOLE BLOOD - Abnormal    Lactic Acid 0.6 (*)    UA MACROSCOPIC WITH REFLEX TO MICRO AND CULTURE - Abnormal    Color Urine Light Brown (*)     Appearance Urine Cloudy (*)     Glucose Urine Negative      Bilirubin Urine Negative      Ketones Urine Negative      Specific Gravity Urine 1.014      Blood Urine Moderate (*)     pH Urine 6.0      Protein Albumin Urine 100 (*)     Urobilinogen Urine Normal      Nitrite Urine Positive (*)     Leukocyte Esterase Urine Large (*)     Bacteria Urine Few (*)     Mucus Urine Present (*)     RBC Urine 61 (*)     WBC Urine >182 (*)    COMPREHENSIVE METABOLIC PANEL - Normal    Sodium 139      Potassium 3.6      Chloride 102      Carbon Dioxide (CO2) 23      Anion Gap 14      Urea Nitrogen 17.4      Creatinine 0.89      Calcium 9.0      Glucose 94      Alkaline Phosphatase 75      AST 16      ALT 16      Protein Total 6.4      Albumin 3.6      Bilirubin Total 0.3      GFR Estimate 72     BLOOD CULTURE   BLOOD CULTURE   URINE CULTURE        Emergency Department Course & Assessments:       Interventions:  Medications   0.9% sodium chloride BOLUS (0 mLs Intravenous Stopped 1/29/23 2218)   cefTRIAXone (ROCEPHIN) 2 g vial to attach to NS  100 ml bag for ADULTS or NS 50 ml bag for PEDS (0 g Intravenous Stopped 1/29/23 2167)        Independent Interpretation (X-rays, CTs, rhythm strip):  CT reviewed, no obstructive stone visualized    Consultations/Discussion of Management or Tests:  2216 I obtained history and examined the patient as noted above.   I rechecked the patient and explained findings.    Disposition:  The patient was discharged to home.     Impression & Plan      CMS Diagnoses: None    Medical Decision Making:  Presentation is most consistent with urinary tract infection.  She has had fevers at home and presents tachycardic consistent with sepsis.  Work-up shows no evidence of severe sepsis or septic shock.  White blood cell count is normal.  Heart rate normalized during her ED course.  I was concerned that she may have an infected stone fragment that was still in the ureter.  Treated with ceftriaxone while awaiting imaging.  CT scan did not support this finding.  Recommendation for urologic consultation was noted.  I discussed this with her and can try to contact her urologist through [facility].  She stated that she can call them in the morning.  Urine culture is in process.  Received ceftriaxone here and is being prescribed ciprofloxacin after confirming that she is not on any diabetes medications or steroid medications that can increase the risk of complications from this medication.    Diagnosis:    ICD-10-CM    1. Pyelonephritis  N12       2. History of renal stone  Z87.442            Discharge Medications:  New Prescriptions    CIPROFLOXACIN (CIPRO) 500 MG TABLET    Take 1 tablet (500 mg) by mouth 2 times daily for 7 days       Scribe Disclosure:  TONI, Az Dumont, am serving as a scribe at 10:31 PM on 1/29/2023 to document services personally performed by Michelle Middleton MD based on my observations and the provider's statements to me.       Michelle Middleton MD  01/30/23 0034

## 2023-01-30 NOTE — ED TRIAGE NOTES
Pt reports running a fever all day and taking tylenol (last taken 1700).    Pt had kidney sten removed on the 1/2 pt al experiencing urinary urgency with urination     Has some sob denies CP

## 2023-01-31 LAB — BACTERIA UR CULT: ABNORMAL

## 2023-01-31 NOTE — RESULT ENCOUNTER NOTE
[facility] Emergency Dept discharge antibiotic (if prescribed): Ciprofloxacin (Cipro) 500 mg tablet, 1 tablet (500 mg) by mouth 2 times daily for 7 days.   Date of Rx (if applicable):  1/30/23  No changes in treatment per [facility] ED Lab Result Urine culture protocol.

## 2023-02-01 ENCOUNTER — TELEPHONE (OUTPATIENT)
Dept: EMERGENCY MEDICINE | Facility: CLINIC | Age: 63
End: 2023-02-01
Payer: COMMERCIAL

## 2023-02-01 NOTE — LETTER
February 3, 2023        Esmer Churchill  [address]  [place] [address]          Dear Esmer Churchill:    You were seen in the [facility] Emergency Department at [facility] EMERGENCY DEPT on 1/29/2023.  We are unable to reach you by phone, so we are sending you this letter.     It is important that you call [facility] Emergency Department lab result nurse at [phone], as we have information to relay to you AND/OR we MAY have to make some changes in your treatment.    Best time to call back is between 9AM and 5:30PM, 7 days a week.      Sincerely,     [facility] Emergency Department Lab Result RN  [phone]

## 2023-02-01 NOTE — TELEPHONE ENCOUNTER
[facility] Emergency Department Lab result notification    [place] ED lab result protocol used  Urine Culture    Reason for call  Notify of lab results, assess symptoms,  review ED providers recommendations/discharge instructions (if necessary) and advise per ED lab result f/u protocol    Lab Result (including Rx patient on, if applicable)  Final Urine Culture Report on 1/31/23  [facility] Emergency Dept discharge antibiotic prescribed: Ciprofloxacin (Cipro) 500 mg tablet, 1 tablet (500 mg) by mouth 2 times daily for 7 days.  #1. Bacteria, >100,000 CFU/mL Escherichia coli ESBL,  is [RESISTANT] to antibiotic.   Recommendations in treatment per [facility] ED lab result Urine Culture protocol.    Information table from Emergency Dept Provider visit on 1/29/23  Symptoms reported at ED visit (Chief complaint, HPI) Fever, Urinary Frequency, and Abdominal Pain     The history is provided by the patient.      Esmer Churchill is a 62 year old female with a history of hypertension and recent stent removal who presents with fever, urinary frequency, and abdominal pain. Patient provides that today at 0300, she woke up with a fever and the urinary frequency. This has continued throughout the day and her highest temperature was 102.4. She has been taking Tylenol to manage the fever. She additionally notes that she has had a right lateral abdominal pain, around the area where she recently had a kidney stone and stent placement. She had the stent removed on the 26th. Patient denies any nausea or vomiting.  No visible hematuria.  No respiratory symptoms.   Significant Medical hx, if applicable (i.e. CKD, diabetes) prediabetes   Allergies Allergies   Allergen Reactions     Oxycodone Nausea and Vomiting      Weight, if applicable Wt Readings from Last 2 Encounters:   01/29/23 78.5 kg (173 lb)   11/12/22 79.4 kg (175 lb)      Coumadin/Warfarin [Yes /No] No   Creatinine Level (mg/dl) Creatinine   Date Value  Ref Range Status   01/29/2023 0.89 0.51 - 0.95 mg/dL Final   04/29/2021 0.80 0.52 - 1.04 mg/dL Final      Creatinine clearance (ml/min), if applicable Serum creatinine: 0.89 mg/dL 01/29/23 2306  Estimated creatinine clearance: 61.4 mL/min   Pregnant (Yes/No/NA) NA   Breastfeeding (Yes/No/NA) NA   ED providers Impression and Plan (applicable information) Presentation is most consistent with urinary tract infection.  She has had fevers at home and presents tachycardic consistent with sepsis.  Work-up shows no evidence of severe sepsis or septic shock.  White blood cell count is normal.  Heart rate normalized during her ED course.  I was concerned that she may have an infected stone fragment that was still in the ureter.  Treated with ceftriaxone while awaiting imaging.  CT scan did not support this finding.  Recommendation for urologic consultation was noted.  I discussed this with her and can try to contact her urologist through [facility].  She stated that she can call them in the morning.  Urine culture is in process.  Received ceftriaxone here and is being prescribed ciprofloxacin after confirming that she is not on any diabetes medications or steroid medications that can increase the risk of complications from this medication.      ED diagnosis  Pyelonephritis     History of renal stone     ED provider Michelle Middleton MD        RN Assessment (Patient s current Symptoms), include time called.  [Insert Left message here if message left]  10:43a  Left voicemail message requesting a call back to [facility] ED Lab Result RN at [phone].  RN is available every day between 9 a.m. and 5:30 p.m.  See Telephone encounter.    Call to assess and encourage patient to contact urologist with [facility] or return to ED ([facility] per [facility])         Jossie Horton RN  [facility] [employer] [place]  Emergency Dept Lab Result RN  Ph# [phone]     Copy of Lab result    Urine Culture  Order: [id number] - Reflex for Order [id number]   Status: Final result      Visible to patient: No (inaccessible in MyChart)     Specimen Information: Urine, Clean Catch    2 Result Notes  Culture >100,000 CFU/mL Escherichia coli ESBL Abnormal             Resulting Agency: [facility]     Susceptibility    Escherichia coli ESBL (1)    Antibiotic Interpretation Sensitivity  Method Status    Ampicillin Resistant >=32 ug/mL SARAH Final    Piperacillin/Tazobactam Susceptible 8 ug/mL SARAH Final    Cefazolin Resistant >=64 ug/mL SARAH Final     Cefazolin SARAH breakpoints are for the treatment of uncomplicated urinary tract infections. For the treatment of systemic infections, please contact the laboratory for additional testing.       Cefoxitin Susceptible <=4 ug/mL SARAH Final    Ceftazidime Resistant   SARAH Final    Ceftriaxone Resistant   SARAH Final    Cefepime Resistant   SARAH Final    Meropenem Susceptible <=0.25 ug/mL SARAH Final     Enterobacterales that are susceptible to meropenem are usually susceptible to ertapenem.       Amikacin Susceptible 8 ug/mL SARAH Final    Gentamicin Resistant >=16 ug/mL SARAH Final    Tobramycin Resistant >=16 ug/mL SARAH Final    Ciprofloxacin Resistant >=4 ug/mL SARAH Final    Levofloxacin Resistant >=8 ug/mL SARAH Final    Nitrofurantoin Susceptible <=16 ug/mL SARAH Final    Trimethoprim/Sulfamethoxazole Resistant >16/304 ug/mL SARAH Final    Susceptibility Comments    ESBL (extended spectrum beta lactamase) producing organisms require contact precautions.         Specimen Collected: 01/29/23 10:38 PM Last Resulted: 01/31/23  9:34 PM

## 2023-02-02 NOTE — RESULT ENCOUNTER NOTE
Left voicemail message requesting a call back to [facility] ED Lab Result RN at [phone].  RN is available every day between 9 a.m. and 5:30 p.m.  See Telephone encounter.

## 2023-02-03 NOTE — TELEPHONE ENCOUNTER
[facility] [facility] Emergency Department/Urgent Care Lab result notification     Patient/parent Name  Denies    RN Assessment (Patient s current Symptoms), include time called.  [Insert Left message here if message left]  3:34p  Patient returning call.Discussed urine culture results. She reports she is actually at [facility] as a patient. She is aware of her urine culture results. She will be going home from [facility] with treatment.  Please Contact your PCP clinic or return to the Emergency department if your:    Symptoms return.    Symptoms worsen or other concerning symptom's.      Jossie Horton, SHAE  [facility] [employer] [place]  Emergency Dept Lab Result RN  Ph# [phone]     Copy of Lab result

## 2023-02-03 NOTE — TELEPHONE ENCOUNTER
[facility] Emergency Department Lab result notification:    Reason for Letter being mailed out:      Culture (Urine) showing bacteria resistant/Not Tested to antibiotic Patient discharged with from the Emergency Dept.    Unable to reach via telephone so letter sent with a message requesting a call back to [phone] between 9 a.m. and 5:30 p.m., 7 days a week for patient's ED/ lab results.   Lab result:  Final Urine Culture Report on 1/31/23  [facility] Emergency Dept discharge antibiotic prescribed: Ciprofloxacin (Cipro) 500 mg tablet, 1 tablet (500 mg) by mouth 2 times daily for 7 days.  #1. Bacteria, >100,000 CFU/mL Escherichia coli ESBL,  is [RESISTANT] to antibiotic.   Recommendations in treatment per [facility] ED lab result Urine Culture protocol..    Miscellaneous information: BILLY Judge RN  [facility] [employer] [place]  Emergency Dept Lab Result RN  # [phone]

## 2023-02-04 LAB
BACTERIA BLD CULT: NO GROWTH
BACTERIA BLD CULT: NO GROWTH

## 2023-05-28 ENCOUNTER — OFFICE VISIT (OUTPATIENT)
Dept: URGENT CARE | Facility: URGENT CARE | Age: 63
End: 2023-05-28
Payer: COMMERCIAL

## 2023-05-28 VITALS
HEART RATE: 89 BPM | BODY MASS INDEX: 32.69 KG/M2 | RESPIRATION RATE: 16 BRPM | DIASTOLIC BLOOD PRESSURE: 82 MMHG | SYSTOLIC BLOOD PRESSURE: 119 MMHG | TEMPERATURE: 96.9 F | OXYGEN SATURATION: 98 % | WEIGHT: 173 LBS

## 2023-05-28 DIAGNOSIS — S61.011A LACERATION OF RIGHT THUMB WITHOUT FOREIGN BODY WITHOUT DAMAGE TO NAIL, INITIAL ENCOUNTER: Primary | ICD-10-CM

## 2023-05-28 PROCEDURE — 12001 RPR S/N/AX/GEN/TRNK 2.5CM/<: CPT | Performed by: EMERGENCY MEDICINE

## 2023-05-28 NOTE — PROGRESS NOTES
SUBJECTIVE:   Esmer Churchill is a 62 year old female who presents to clinic today for the following health issues:  Chief Complaint   Patient presents with     Laceration     Laceration on right thumb throbbing pain since this morning.          Esmer Churchill is a 62 year old female sustained laceration of the right thumb ~8 hours ago. Nature of injury: patient accidentally cut her finger on a metal ruler.  Patient denies any numbness or difficulties moving the affected thumb. Patient having some difficulty controlling bleeding with pressure. Tetanus vaccination status reviewed: tetanus re-vaccination not indicated.      ROS: See HPI      OBJECTIVE:     Vitals: /82 (BP Location: Left arm, Patient Position: Sitting, Cuff Size: Adult Large)   Pulse 89   Temp 96.9  F (36.1  C) (Tympanic)   Resp 16   Wt 78.5 kg (173 lb)   SpO2 98%   BMI 32.69 kg/m      Physical Exam:    Patient appears well. Vitals stable. Laceration 1 cm linear laceration noted at the lateral aspect of the thumb.  Description: clean wound edges, no foreign bodies. Capillary refill <2 seconds. No macerated tissue or significant skin loss.       Procedures:          Laceration Repair        LACERATION:  A clean 1  cm laceration.      LOCATION:  Right thumb      FUNCTION:  Distally sensation, circulation, motor and tendon function are   intact.      ANESTHESIA: Anesthesia with 1% Lidocaine without Epinephrine;   approximately 1cc.       PREPARATION:  Scrubbing with Normal Saline and Betadine      DEBRIDEMENT:  no debridement and wound explored, no foreign body   found      CLOSURE:  Wound was closed with 1 Layer: Skin closed with 3 x 4.0   Ethylon using interrupted sutures. Patient tolerated procedure well; no   complications.           Diagnosis:  (S61.011A) Laceration of right thumb without foreign body without damage to nail, initial encounter  (primary encounter diagnosis)        ASSESSMENT and PLAN:   The patient presented with a laceration  as described above.  The wound was carefully evaluated and explored.  Wound cleansed, inspected for foreign bodies, and sutured using sterile technique per the above procedure note. Antibiotic ointment and sterile dressing applied. There is no evidence of muscular, tendon, or bony damage with this laceration.  No signs of foreign body.  Tetanus status addressed. Wound care and possible complications (infection, scarring) were reviewed with the patient and we discussed indication to be evaluated back in clinic sooner. Otherwise, follow up with primary/UC/clinic will be indicated for suture removal in 5-7 days.           Spike Fox PA-C, May 28, 2023

## 2023-06-08 ENCOUNTER — OFFICE VISIT (OUTPATIENT)
Dept: URGENT CARE | Facility: URGENT CARE | Age: 63
End: 2023-06-08
Payer: COMMERCIAL

## 2023-06-08 VITALS — TEMPERATURE: 98.2 F

## 2023-06-08 DIAGNOSIS — S61.011A LACERATION OF RIGHT THUMB WITHOUT FOREIGN BODY WITHOUT DAMAGE TO NAIL, INITIAL ENCOUNTER: Primary | ICD-10-CM

## 2023-06-08 PROCEDURE — 99207 PR NO CHARGE NURSE ONLY: CPT

## 2023-06-08 NOTE — PROGRESS NOTES
Esmer Churchill presents to the clinic today for removal of sutures.  The patient has had the sutures in place for 10 days.  There has been no history of infection or drainage.  3 sutures are seen located on the right thumb.  The wound is healing well with no signs of infection.  Tetanus status is up to date.   All sutures were easily removed today.  Routine wound care discussed.  The patient will follow up as needed.

## 2024-01-10 ENCOUNTER — APPOINTMENT (OUTPATIENT)
Dept: GENERAL RADIOLOGY | Facility: CLINIC | Age: 64
End: 2024-01-10
Attending: EMERGENCY MEDICINE
Payer: COMMERCIAL

## 2024-01-10 ENCOUNTER — NURSE TRIAGE (OUTPATIENT)
Dept: NURSING | Facility: CLINIC | Age: 64
End: 2024-01-10
Payer: COMMERCIAL

## 2024-01-10 ENCOUNTER — HOSPITAL ENCOUNTER (EMERGENCY)
Facility: CLINIC | Age: 64
Discharge: HOME | End: 2024-01-11
Attending: EMERGENCY MEDICINE | Admitting: EMERGENCY MEDICINE
Payer: COMMERCIAL

## 2024-01-10 VITALS
TEMPERATURE: 99 F | DIASTOLIC BLOOD PRESSURE: 77 MMHG | SYSTOLIC BLOOD PRESSURE: 131 MMHG | HEIGHT: 60 IN | OXYGEN SATURATION: 99 % | HEART RATE: 92 BPM | BODY MASS INDEX: 35.34 KG/M2 | RESPIRATION RATE: 16 BRPM | WEIGHT: 180 LBS

## 2024-01-10 DIAGNOSIS — K59.01 SLOW TRANSIT CONSTIPATION: ICD-10-CM

## 2024-01-10 DIAGNOSIS — R10.84 GENERALIZED ABDOMINAL PAIN: ICD-10-CM

## 2024-01-10 LAB
ALBUMIN SERPL BCG-MCNC: 4.3 G/DL (ref 3.5–5.2)
ALBUMIN UR-MCNC: NEGATIVE MG/DL
ALP SERPL-CCNC: 87 U/L (ref 40–150)
ALT SERPL W P-5'-P-CCNC: 22 U/L (ref 0–50)
ANION GAP SERPL CALCULATED.3IONS-SCNC: 9 MMOL/L (ref 7–15)
APPEARANCE UR: CLEAR
AST SERPL W P-5'-P-CCNC: 20 U/L (ref 0–45)
BASOPHILS # BLD AUTO: 0 10E3/UL (ref 0–0.2)
BASOPHILS NFR BLD AUTO: 0 %
BILIRUB SERPL-MCNC: 0.3 MG/DL
BILIRUB UR QL STRIP: NEGATIVE
BUN SERPL-MCNC: 13.1 MG/DL (ref 8–23)
CALCIUM SERPL-MCNC: 9.8 MG/DL (ref 8.8–10.2)
CHLORIDE SERPL-SCNC: 105 MMOL/L (ref 98–107)
COLOR UR AUTO: ABNORMAL
CREAT SERPL-MCNC: 0.64 MG/DL (ref 0.51–0.95)
DEPRECATED HCO3 PLAS-SCNC: 27 MMOL/L (ref 22–29)
EGFRCR SERPLBLD CKD-EPI 2021: >90 ML/MIN/1.73M2
EOSINOPHIL # BLD AUTO: 0 10E3/UL (ref 0–0.7)
EOSINOPHIL NFR BLD AUTO: 0 %
ERYTHROCYTE [DISTWIDTH] IN BLOOD BY AUTOMATED COUNT: 16 % (ref 10–15)
FLUAV RNA SPEC QL NAA+PROBE: NEGATIVE
FLUBV RNA RESP QL NAA+PROBE: NEGATIVE
GLUCOSE SERPL-MCNC: 106 MG/DL (ref 70–99)
GLUCOSE UR STRIP-MCNC: NEGATIVE MG/DL
HCT VFR BLD AUTO: 37 % (ref 35–47)
HGB BLD-MCNC: 11.7 G/DL (ref 11.7–15.7)
HGB UR QL STRIP: NEGATIVE
HOLD SPECIMEN: NORMAL
HOLD SPECIMEN: NORMAL
IMM GRANULOCYTES # BLD: 0.1 10E3/UL
IMM GRANULOCYTES NFR BLD: 0 %
KETONES UR STRIP-MCNC: NEGATIVE MG/DL
LEUKOCYTE ESTERASE UR QL STRIP: ABNORMAL
LIPASE SERPL-CCNC: 37 U/L (ref 13–60)
LYMPHOCYTES # BLD AUTO: 3.1 10E3/UL (ref 0.8–5.3)
LYMPHOCYTES NFR BLD AUTO: 26 %
MCH RBC QN AUTO: 28 PG (ref 26.5–33)
MCHC RBC AUTO-ENTMCNC: 31.6 G/DL (ref 31.5–36.5)
MCV RBC AUTO: 89 FL (ref 78–100)
MONOCYTES # BLD AUTO: 0.8 10E3/UL (ref 0–1.3)
MONOCYTES NFR BLD AUTO: 7 %
NEUTROPHILS # BLD AUTO: 8.1 10E3/UL (ref 1.6–8.3)
NEUTROPHILS NFR BLD AUTO: 67 %
NITRATE UR QL: NEGATIVE
NRBC # BLD AUTO: 0 10E3/UL
NRBC BLD AUTO-RTO: 0 /100
PH UR STRIP: 6.5 [PH] (ref 5–7)
PLATELET # BLD AUTO: 251 10E3/UL (ref 150–450)
POTASSIUM SERPL-SCNC: 3.3 MMOL/L (ref 3.4–5.3)
PROT SERPL-MCNC: 7.1 G/DL (ref 6.4–8.3)
RBC # BLD AUTO: 4.18 10E6/UL (ref 3.8–5.2)
RSV RNA SPEC NAA+PROBE: NEGATIVE
SARS-COV-2 RNA RESP QL NAA+PROBE: NEGATIVE
SODIUM SERPL-SCNC: 141 MMOL/L (ref 135–145)
SP GR UR STRIP: 1.02 (ref 1–1.03)
UROBILINOGEN UR STRIP-MCNC: NORMAL MG/DL
WBC # BLD AUTO: 12 10E3/UL (ref 4–11)

## 2024-01-10 PROCEDURE — 81003 URINALYSIS AUTO W/O SCOPE: CPT | Performed by: EMERGENCY MEDICINE

## 2024-01-10 PROCEDURE — 83690 ASSAY OF LIPASE: CPT | Performed by: EMERGENCY MEDICINE

## 2024-01-10 PROCEDURE — 85025 COMPLETE CBC W/AUTO DIFF WBC: CPT | Performed by: EMERGENCY MEDICINE

## 2024-01-10 PROCEDURE — 36415 COLL VENOUS BLD VENIPUNCTURE: CPT | Performed by: EMERGENCY MEDICINE

## 2024-01-10 PROCEDURE — 87637 SARSCOV2&INF A&B&RSV AMP PRB: CPT | Performed by: EMERGENCY MEDICINE

## 2024-01-10 PROCEDURE — 74019 RADEX ABDOMEN 2 VIEWS: CPT

## 2024-01-10 PROCEDURE — 250N000011 HC RX IP 250 OP 636: Performed by: EMERGENCY MEDICINE

## 2024-01-10 PROCEDURE — 96374 THER/PROPH/DIAG INJ IV PUSH: CPT

## 2024-01-10 PROCEDURE — 99284 EMERGENCY DEPT VISIT MOD MDM: CPT | Mod: 25

## 2024-01-10 PROCEDURE — 80053 COMPREHEN METABOLIC PANEL: CPT | Performed by: EMERGENCY MEDICINE

## 2024-01-10 PROCEDURE — 85004 AUTOMATED DIFF WBC COUNT: CPT | Performed by: EMERGENCY MEDICINE

## 2024-01-10 RX ORDER — ONDANSETRON 2 MG/ML
4 INJECTION INTRAMUSCULAR; INTRAVENOUS ONCE
Status: COMPLETED | OUTPATIENT
Start: 2024-01-10 | End: 2024-01-10

## 2024-01-10 RX ADMIN — ONDANSETRON 4 MG: 2 INJECTION INTRAMUSCULAR; INTRAVENOUS at 23:06

## 2024-01-10 ASSESSMENT — ACTIVITIES OF DAILY LIVING (ADL): ADLS_ACUITY_SCORE: 35

## 2024-01-11 PROCEDURE — 250N000013 HC RX MED GY IP 250 OP 250 PS 637: Performed by: EMERGENCY MEDICINE

## 2024-01-11 RX ADMIN — MAGNESIUM HYDROXIDE 30 ML: 400 SUSPENSION ORAL at 00:00

## 2024-01-11 NOTE — TELEPHONE ENCOUNTER
"[facility]  I have a mild stomachache and it feels like I am going to throw up, but I don't throw up. It has been bothering me the last couple of days. Sneezing. COVID test negative 1 hr ago. Nasal congestion. Occasional cough.  Pain is located upper middle abdomen, currently pain=\"2\".  Constant. Hx of acid reflux, not currently having a problem with that: usually heartburn. TUMS is not helping. RX pantoprazole, but haven't taken any because the sx are not in my chest (heartburn).     Triaged to a disposition of Go to ED now.   She states she already called the CareLine and was told to go to [facility] ER.   Patient is leaving town on a plane tomorrow @ 8am.  She is considering going to [facility] ER (nearest ER) She will check with insurance re: in network provider question.     Helen Barros RN Triage Nurse Advisor 7:18 PM 1/10/2024      Reason for Disposition   [1] Pain lasts > 10 minutes AND [2] age > 50    Additional Information   Negative: SEVERE difficulty breathing (e.g., struggling for each breath, speaks in single words)   Negative: Shock suspected (e.g., cold/pale/clammy skin, too weak to stand, low BP, rapid pulse)   Negative: Difficult to awaken or acting confused (e.g., disoriented, slurred speech)   Negative: Passed out (i.e., lost consciousness, collapsed and was not responding)   Negative: Visible sweat on face or sweat dripping down face   Negative: Sounds like a life-threatening emergency to the triager   Negative: Followed an abdomen (stomach) injury   Negative: Chest pain   Negative: [1] Abdominal pain AND [2] pregnant < 20 weeks   Negative: [1] Abdominal pain AND [2] pregnant 20 or more weeks   Negative: [1] Abdominal pain AND [2] postpartum (from 0 to 6 weeks after delivery)   Negative: Abdomen bloating or swelling are main symptoms   Negative: [1] SEVERE pain (e.g., excruciating) AND [2] present > 1 hour    Protocols used: Abdominal Pain - Upper-A-AH    "

## 2024-01-11 NOTE — ED PROVIDER NOTES
History     Chief Complaint:  Abdominal Pain (Abd. Pain for the last 2 days. Bi9lat. Mid abd. Pain. No back pain or vomiting.) and Cough       HPI   Esmer Churchill is a 63 year old female who comes in with 2 days of diffuse abdominal cramping pain.  She has had nausea but no vomiting, no diarrhea with normal bowel movements.  Her last bowel movement was tonight and it was fully normal.  No fevers or chills, appetite has been okay.  No urinary symptoms.  Pain does not radiate.  She feels a little bloated.  She did try some TheraFlu and some proton pump inhibitor therapy and it did not seem to help much.  No other associated signs or symptoms.      Independent Historian:   None - Patient Only    Review of External Notes:   I reviewed her office note from 10/24/2023      Medications:    albuterol (PROAIR HFA) 108 (90 Base) MCG/ACT inhaler  albuterol (PROAIR HFA/PROVENTIL HFA/VENTOLIN HFA) 108 (90 BASE) MCG/ACT Inhaler  calcium carbonate (TUMS) 500 MG chewable tablet  hydrochlorothiazide 12.5 MG TABS tablet  lisinopril (PRINIVIL/ZESTRIL) 40 MG tablet  multivitamin, therapeutic with minerals (THERA-VIT-M) TABS  omeprazole (PRILOSEC) 20 MG CR capsule  order for DME  oxybutynin chloride 15 MG TB24  ranitidine (ZANTAC) 75 MG tablet        Past Medical History:    Past Medical History:   Diagnosis Date    Cerebral infarction (H)     GERD (gastroesophageal reflux disease)     Hypertension     Impaired glucose tolerance     Spinal stenosis        Past Surgical History:    Past Surgical History:   Procedure Laterality Date    GYN SURGERY          kidney stone surgery          Physical Exam   Patient Vitals for the past 24 hrs:   BP Temp Temp src Pulse Resp SpO2 Height Weight   01/10/24 2208 131/77 -- -- 92 -- 99 % -- --   01/10/24 2030 124/75 99  F (37.2  C) Temporal 99 16 98 % 1.524 m (5') 81.6 kg (180 lb)        Physical Exam  Nursing note and vitals reviewed.    Constitutional:  Appears comfortable.    HENT:                 Nose normal.  No discharge.      Oral mucosa is moist.  Eyes:    Conjunctivae are normal without injection.  Pupils are equal.  Cardiovascular:  Normal rate, regular rhythm with normal S1 and S2.   Pulmonary:  Effort normal and breath sounds clear to auscultation bilaterally.  GI:    Soft.  Some mild diffuse tenderness but no focal pain, no rebound or guarding, no flank pain.  She has maybe some mild distention with a little bit of tympany on percussion.  Musculoskeletal:  Normal range of motion. No extremity deformity.  Neurological:   Alert and oriented. No focal weakness.  Skin:    Skin is warm and dry. No rash noted.   Psychiatric:   Behavior is normal. Appropriate mood and affect.     Judgment and thought content normal.       Emergency Department Course   ECG    Imaging:  Abdomen XR, 2 vw, flat and upright   Final Result   IMPRESSION:    1. A moderate amount of stool is present within the colon.   2. No convincing evidence of bowel obstruction. Note that a paucity of gas in the small bowel limits its evaluation.              Laboratory:  Labs Ordered and Resulted from Time of ED Arrival to Time of ED Departure   COMPREHENSIVE METABOLIC PANEL - Abnormal       Result Value    Sodium 141      Potassium 3.3 (*)     Carbon Dioxide (CO2) 27      Anion Gap 9      Urea Nitrogen 13.1      Creatinine 0.64      GFR Estimate >90      Calcium 9.8      Chloride 105      Glucose 106 (*)     Alkaline Phosphatase 87      AST 20      ALT 22      Protein Total 7.1      Albumin 4.3      Bilirubin Total 0.3     URINALYSIS MACROSCOPIC - Abnormal    Color Urine Light Yellow      Appearance Urine Clear      Glucose Urine Negative      Bilirubin Urine Negative      Ketones Urine Negative      Specific Gravity Urine 1.022      Blood Urine Negative      pH Urine 6.5      Protein Albumin Urine Negative      Urobilinogen Urine Normal      Nitrite Urine Negative      Leukocyte Esterase Urine Moderate (*)    CBC WITH PLATELETS AND  DIFFERENTIAL - Abnormal    WBC Count 12.0 (*)     RBC Count 4.18      Hemoglobin 11.7      Hematocrit 37.0      MCV 89      MCH 28.0      MCHC 31.6      RDW 16.0 (*)     Platelet Count 251      % Neutrophils 67      % Lymphocytes 26      % Monocytes 7      % Eosinophils 0      % Basophils 0      % Immature Granulocytes 0      NRBCs per 100 WBC 0      Absolute Neutrophils 8.1      Absolute Lymphocytes 3.1      Absolute Monocytes 0.8      Absolute Eosinophils 0.0      Absolute Basophils 0.0      Absolute Immature Granulocytes 0.1      Absolute NRBCs 0.0     LIPASE - Normal    Lipase 37     INFLUENZA A/B, RSV, & SARS-COV2 PCR - Normal    Influenza A PCR Negative      Influenza B PCR Negative      RSV PCR Negative      SARS CoV2 PCR Negative          Procedures   none    Emergency Department Course & Assessments:             Interventions:  Medications   magnesium hydroxide (MILK OF MAGNESIA) suspension 30 mL (has no administration in time range)   ondansetron (ZOFRAN) injection 4 mg (4 mg Intravenous $Given 1/10/24 8075)        Assessments:  2235    Independent Interpretation (X-rays, CTs, rhythm strip):  I reviewed the abdominal x-ray and there is a large volume of stool but no evidence of bowel obstruction.    Consultations/Discussion of Management or Tests:  None        Social Determinants of Health affecting care:   None    Disposition:  The patient was discharged to home.     Impression & Plan        Medical Decision Making:  Patient comes in for evaluation of abdominal pain.  She does feel little bit distended or bloated but the pain is not localized, no fevers, her labs are normal.  I got a flat and upright abdominal x-ray and there is a very large volume of stool present throughout her whole colon.  I believe this is probably constipation as it is diffuse and worse after eating.  She was given a dose of milk of magnesia here and I am going to put her on a bowel regimen at home to clean her out with follow-up in  the clinic next week.  She was given instructions on why to return to the ER if she gets worse.    Push fluids, take MiraLAX with water or juice 2 times a day, stool softener 2 tablets twice a day.  Take this regimen for 4 to 5 days or until you feel you have cleaned out significantly and then decrease it to 1 tablet twice a day of the stool softener and 1-2 times a day for the MiraLAX.  See your doctor next week for recheck.  If you develop high fevers with vomiting and localizing pain, return to the ER.    Diagnosis:    ICD-10-CM    1. Generalized abdominal pain  R10.84       2. Slow transit constipation  K59.01            Discharge Medications:  New Prescriptions    No medications on file          Zara Castrejon MD  1/10/2024   Zara Castrejon MD Powell, Tracy Alan, MD  01/10/24 3512

## 2024-01-11 NOTE — ED TRIAGE NOTES
Triage Assessment (Adult)       Row Name 01/10/24 2033          Triage Assessment    Airway WDL WDL        Respiratory WDL    Respiratory WDL WDL        Skin Circulation/Temperature WDL    Skin Circulation/Temperature WDL WDL        Cardiac WDL    Cardiac WDL WDL        Peripheral/Neurovascular WDL    Peripheral Neurovascular WDL WDL        Cognitive/Neuro/Behavioral WDL    Cognitive/Neuro/Behavioral WDL WDL                 Abd. Pain for the last 2 days. Bi9lat. Mid abd. Pain. No back pain or vomiting.

## 2024-12-18 ENCOUNTER — HOSPITAL ENCOUNTER (EMERGENCY)
Facility: CLINIC | Age: 64
Discharge: HOME | End: 2024-12-19
Attending: EMERGENCY MEDICINE
Payer: COMMERCIAL

## 2024-12-18 ENCOUNTER — TRANSFERRED RECORDS (OUTPATIENT)
Dept: HEALTH INFORMATION MANAGEMENT | Facility: CLINIC | Age: 64
End: 2024-12-18

## 2024-12-18 DIAGNOSIS — R07.89 CHEST WALL PAIN: ICD-10-CM

## 2024-12-18 LAB
ANION GAP SERPL CALCULATED.3IONS-SCNC: 11 MMOL/L (ref 7–15)
BASOPHILS # BLD AUTO: 0 10E3/UL (ref 0–0.2)
BASOPHILS NFR BLD AUTO: 1 %
BUN SERPL-MCNC: 17.2 MG/DL (ref 8–23)
CALCIUM SERPL-MCNC: 9.8 MG/DL (ref 8.8–10.4)
CHLORIDE SERPL-SCNC: 105 MMOL/L (ref 98–107)
CREAT SERPL-MCNC: 0.61 MG/DL (ref 0.51–0.95)
EGFRCR SERPLBLD CKD-EPI 2021: >90 ML/MIN/1.73M2
EOSINOPHIL # BLD AUTO: 0.1 10E3/UL (ref 0–0.7)
EOSINOPHIL NFR BLD AUTO: 2 %
ERYTHROCYTE [DISTWIDTH] IN BLOOD BY AUTOMATED COUNT: 15.9 % (ref 10–15)
FLUAV RNA SPEC QL NAA+PROBE: NEGATIVE
FLUBV RNA RESP QL NAA+PROBE: NEGATIVE
GLUCOSE SERPL-MCNC: 90 MG/DL (ref 70–99)
HCO3 SERPL-SCNC: 24 MMOL/L (ref 22–29)
HCT VFR BLD AUTO: 41.3 % (ref 35–47)
HGB BLD-MCNC: 13.1 G/DL (ref 11.7–15.7)
HOLD SPECIMEN: NORMAL
IMM GRANULOCYTES # BLD: 0 10E3/UL
IMM GRANULOCYTES NFR BLD: 0 %
LYMPHOCYTES # BLD AUTO: 2.2 10E3/UL (ref 0.8–5.3)
LYMPHOCYTES NFR BLD AUTO: 53 %
MCH RBC QN AUTO: 27.9 PG (ref 26.5–33)
MCHC RBC AUTO-ENTMCNC: 31.7 G/DL (ref 31.5–36.5)
MCV RBC AUTO: 88 FL (ref 78–100)
MONOCYTES # BLD AUTO: 0.6 10E3/UL (ref 0–1.3)
MONOCYTES NFR BLD AUTO: 15 %
NEUTROPHILS # BLD AUTO: 1.3 10E3/UL (ref 1.6–8.3)
NEUTROPHILS NFR BLD AUTO: 30 %
NRBC # BLD AUTO: 0 10E3/UL
NRBC BLD AUTO-RTO: 0 /100
PLATELET # BLD AUTO: 203 10E3/UL (ref 150–450)
POTASSIUM SERPL-SCNC: 3.5 MMOL/L (ref 3.4–5.3)
RBC # BLD AUTO: 4.7 10E6/UL (ref 3.8–5.2)
RSV RNA SPEC NAA+PROBE: NEGATIVE
SARS-COV-2 RNA RESP QL NAA+PROBE: NEGATIVE
SODIUM SERPL-SCNC: 140 MMOL/L (ref 135–145)
TROPONIN T SERPL HS-MCNC: 7 NG/L
WBC # BLD AUTO: 4.3 10E3/UL (ref 4–11)

## 2024-12-18 PROCEDURE — 87637 SARSCOV2&INF A&B&RSV AMP PRB: CPT | Performed by: EMERGENCY MEDICINE

## 2024-12-18 PROCEDURE — 85379 FIBRIN DEGRADATION QUANT: CPT | Performed by: EMERGENCY MEDICINE

## 2024-12-18 PROCEDURE — 84484 ASSAY OF TROPONIN QUANT: CPT | Performed by: EMERGENCY MEDICINE

## 2024-12-18 PROCEDURE — 80048 BASIC METABOLIC PNL TOTAL CA: CPT | Performed by: EMERGENCY MEDICINE

## 2024-12-18 PROCEDURE — 85004 AUTOMATED DIFF WBC COUNT: CPT | Performed by: EMERGENCY MEDICINE

## 2024-12-18 PROCEDURE — 99285 EMERGENCY DEPT VISIT HI MDM: CPT | Mod: 25

## 2024-12-18 PROCEDURE — 93005 ELECTROCARDIOGRAM TRACING: CPT

## 2024-12-18 PROCEDURE — 36415 COLL VENOUS BLD VENIPUNCTURE: CPT | Performed by: EMERGENCY MEDICINE

## 2024-12-18 PROCEDURE — 85025 COMPLETE CBC W/AUTO DIFF WBC: CPT | Performed by: EMERGENCY MEDICINE

## 2024-12-18 ASSESSMENT — ACTIVITIES OF DAILY LIVING (ADL)
ADLS_ACUITY_SCORE: 43
ADLS_ACUITY_SCORE: 43

## 2024-12-19 ENCOUNTER — APPOINTMENT (OUTPATIENT)
Dept: GENERAL RADIOLOGY | Facility: CLINIC | Age: 64
End: 2024-12-19
Attending: EMERGENCY MEDICINE
Payer: COMMERCIAL

## 2024-12-19 VITALS
HEART RATE: 91 BPM | SYSTOLIC BLOOD PRESSURE: 125 MMHG | TEMPERATURE: 97.6 F | DIASTOLIC BLOOD PRESSURE: 82 MMHG | RESPIRATION RATE: 16 BRPM | OXYGEN SATURATION: 97 %

## 2024-12-19 LAB
ATRIAL RATE - MUSE: 96 BPM
D DIMER PPP FEU-MCNC: <0.27 UG/ML FEU (ref 0–0.5)
DIASTOLIC BLOOD PRESSURE - MUSE: NORMAL MMHG
INTERPRETATION ECG - MUSE: NORMAL
P AXIS - MUSE: 58 DEGREES
PR INTERVAL - MUSE: 190 MS
QRS DURATION - MUSE: 100 MS
QT - MUSE: 384 MS
QTC - MUSE: 485 MS
R AXIS - MUSE: 51 DEGREES
SYSTOLIC BLOOD PRESSURE - MUSE: NORMAL MMHG
T AXIS - MUSE: 56 DEGREES
TROPONIN T SERPL HS-MCNC: 7 NG/L
VENTRICULAR RATE- MUSE: 96 BPM

## 2024-12-19 PROCEDURE — 250N000013 HC RX MED GY IP 250 OP 250 PS 637: Performed by: EMERGENCY MEDICINE

## 2024-12-19 PROCEDURE — 71046 X-RAY EXAM CHEST 2 VIEWS: CPT

## 2024-12-19 PROCEDURE — 36415 COLL VENOUS BLD VENIPUNCTURE: CPT | Performed by: EMERGENCY MEDICINE

## 2024-12-19 PROCEDURE — 84484 ASSAY OF TROPONIN QUANT: CPT | Performed by: EMERGENCY MEDICINE

## 2024-12-19 RX ORDER — IBUPROFEN 600 MG/1
600 TABLET, FILM COATED ORAL ONCE
Status: COMPLETED | OUTPATIENT
Start: 2024-12-19 | End: 2024-12-19

## 2024-12-19 RX ADMIN — IBUPROFEN 600 MG: 600 TABLET ORAL at 00:59

## 2024-12-19 ASSESSMENT — ACTIVITIES OF DAILY LIVING (ADL)
ADLS_ACUITY_SCORE: 43
ADLS_ACUITY_SCORE: 43

## 2024-12-19 NOTE — ED PROVIDER NOTES
Emergency Department Note      History of Present Illness     Chief Complaint   Chest Pain      RENETTA Churchill is a 64 year old female with a history of cerebral infarction, hypertension, prediabetes and GERD presenting with chest pain.  The patient reports having suddenly onset sharp chest pain at 2000 today; she states trying to walk from one room to the other when she started to have symptoms.   She was trying to cough but was enable to get air to do so trying to use an inhaler with no relief; she later states have a mild cough for a while. Noting she has situational asthma. Her pain worsens when she takes deep breaths or presses down on her chest. Her pain does not radiate anywhere else. She endorses back pain at baseline. Last year around this time the patient had pneumonia. She was a smoker for 15 years, smoking half a pack a day, stopping 20 years ago. She is a prediabetic and has hyperlipidemia and hypertension, not taking medications to treat them. She has does not have a personal history of blood clots on he legs and lungs, heart disease, stents or valve replacement.  She denies fevers or abdominal pain.       Independent Historian   None    Review of External Notes   N/a    Past Medical History     Medical History and Problem List   Cerebral infarction   GERD   Hypertension  Impaired glucose tolerance  Spinal stenosis  Acute pyelonephritis  Nephrolithiasis  Prediabetes   Anemia    Medications   albuterol   hydrochlorothiazide   lisinopril   omeprazole   oxybutynin chloride  Ranitidine  Amlodipine  tirzepatide-weight managemen   tolterodine     Surgical History   Caesarian section   Kidney stone removal surgery     Physical Exam     Patient Vitals for the past 24 hrs:   BP Temp Temp src Pulse Resp SpO2   12/19/24 0145 -- -- -- -- -- 97 %   12/19/24 0100 -- -- -- -- -- 97 %   12/19/24 0030 -- -- -- -- -- 100 %   12/18/24 2121 125/82 97.6  F (36.4  C) Oral 91 16 97 %     Physical Exam  General: Alert  and cooperative with exam. Patient in mild distress. Normal mentation.  Head:  Scalp is NC/AT  Eyes:  No scleral icterus, PERRL  ENT:  The external nose and ears are normal. The oropharynx is normal and without erythema; mucus membranes are moist. Uvula midline, no evidence of deep space infection.  Neck:  Normal range of motion without rigidity.  CV:  Regular rate and rhythm    No pathologic murmur   Resp:  Breath sounds are clear bilaterally    Non-labored, no retractions or accessory muscle use.   GI:  Abdomen is soft, no distension, no tenderness. No peritoneal signs  MS:  No lower extremity edema. Easily reproducible tenderness to anterior chest.  No concerning overlying skin changes.  Skin:  Warm and dry, No rash or lesions noted.  Neuro: Oriented x 3. No gross motor deficits.     Diagnostics     Lab Results   Labs Ordered and Resulted from Time of ED Arrival to Time of ED Departure   CBC WITH PLATELETS AND DIFFERENTIAL - Abnormal       Result Value    WBC Count 4.3      RBC Count 4.70      Hemoglobin 13.1      Hematocrit 41.3      MCV 88      MCH 27.9      MCHC 31.7      RDW 15.9 (*)     Platelet Count 203      % Neutrophils 30      % Lymphocytes 53      % Monocytes 15      % Eosinophils 2      % Basophils 1      % Immature Granulocytes 0      NRBCs per 100 WBC 0      Absolute Neutrophils 1.3 (*)     Absolute Lymphocytes 2.2      Absolute Monocytes 0.6      Absolute Eosinophils 0.1      Absolute Basophils 0.0      Absolute Immature Granulocytes 0.0      Absolute NRBCs 0.0     BASIC METABOLIC PANEL - Normal    Sodium 140      Potassium 3.5      Chloride 105      Carbon Dioxide (CO2) 24      Anion Gap 11      Urea Nitrogen 17.2      Creatinine 0.61      GFR Estimate >90      Calcium 9.8      Glucose 90     TROPONIN T, HIGH SENSITIVITY - Normal    Troponin T, High Sensitivity 7     INFLUENZA A/B, RSV AND SARS-COV2 PCR - Normal    Influenza A PCR Negative      Influenza B PCR Negative      RSV PCR Negative       SARS CoV2 PCR Negative     TROPONIN T, HIGH SENSITIVITY - Normal    Troponin T, High Sensitivity 7     D DIMER QUANTITATIVE - Normal    D-Dimer Quantitative <0.27         Imaging   Chest XR,  PA & LAT   Final Result   IMPRESSION: Heart size within normal limits. No evidence of pneumonia or heart failure. No pneumothorax or pleural effusion. Arthritic change of the shoulders with undersurface spurring at the acromion on both sides which can cause impingement symptoms.          EKG   ECG results from 12/18/24   EKG 12-lead, tracing only     Value    Systolic Blood Pressure     Diastolic Blood Pressure     Ventricular Rate 96    Atrial Rate 96    CA Interval 190    QRS Duration 100        QTc 485    P Axis 58    R AXIS 51    T Axis 56    Interpretation ECG      Sinus rhythm  Normal ECG  When compared with ECG of 27-Mar-2019 13:06,  No significant change was found  EKG interpreted by me at 2128          Independent Interpretation   CXR: No pneumothorax, infiltrate, or pleural effusion.    ED Course      Medications Administered   Medications   ibuprofen (ADVIL/MOTRIN) tablet 600 mg (600 mg Oral $Given 12/19/24 0059)       Procedures   Procedures     Discussion of Management   None    ED Course   ED Course as of 12/19/24 0637   Thu Dec 19, 2024   0020 I obtained the history and examined the patient as noted above.     0200 I rechecked the patient and explained findings.        Additional Documentation  None    Medical Decision Making / Diagnosis     CMS Diagnoses: None    MIPS       None    MDM   Esmer LUANN Churchill is a 64 year old female who presents with chest pain.  The work up in the Emergency Department is negative.  I considered a broad differential diagnosis in this patient including life-threatening etiologies such as acute coronary syndrome, myocardial infarction, pulmonary embolism, acute aortic dissection, myocarditis, pericarditis, acute valvular insufficiency amongst others.  Other causes considered for  this patient included pneumonia, pneumothorax, chest wall source, pericarditis, pleurisy, esophageal spasm, etc.  No serious etiology for the chest pain were detected today during this visit.  EKG without evidence of acute ischemia, infarction, or significant arrhythmia and troponin is normal x 2; low suspicion for ACS or dissection.  D-dimer is negative; PE unlikely.  Patient has very easily reproducible anterior chest wall tenderness and pain is felt to be musculoskeletal.  Recommended continued supportive care (NSAIDs) and close follow-up with PCP if symptoms not improving.  Return precautions were discussed.  Patient discharged home.      Disposition   The patient was discharged.     Diagnosis     ICD-10-CM    1. Chest wall pain  R07.89            Discharge Medications   Discharge Medication List as of 12/19/2024  1:57 AM        Scribe Disclosure:  Karissa MUÑOZ, am serving as a scribe at 12:20 AM on 12/19/2024 to document services personally performed by Gustavo Keller DO based on my observations and the provider's statements to me.        Gustavo Keller DO  12/19/24 0615

## 2024-12-19 NOTE — ED TRIAGE NOTES
Pt c/o sudden CP starting at 2030 today, pt took her prescribed inhaler w/ no relief, son called EMS, no cardiac hx, EKG SR, VSS, , ABCD intact.       Triage Assessment (Adult)       Row Name 12/18/24 2102          Triage Assessment    Airway WDL WDL        Respiratory WDL    Respiratory WDL WDL        Skin Circulation/Temperature WDL    Skin Circulation/Temperature WDL WDL        Cardiac WDL    Cardiac WDL X        Peripheral/Neurovascular WDL    Peripheral Neurovascular WDL WDL        Cognitive/Neuro/Behavioral WDL    Cognitive/Neuro/Behavioral WDL WDL

## 2025-06-17 ENCOUNTER — APPOINTMENT (OUTPATIENT)
Dept: GENERAL RADIOLOGY | Facility: CLINIC | Age: 65
End: 2025-06-17
Attending: EMERGENCY MEDICINE
Payer: COMMERCIAL

## 2025-06-17 ENCOUNTER — HOSPITAL ENCOUNTER (EMERGENCY)
Facility: CLINIC | Age: 65
Discharge: HOME | End: 2025-06-17
Attending: EMERGENCY MEDICINE | Admitting: EMERGENCY MEDICINE
Payer: COMMERCIAL

## 2025-06-17 VITALS
TEMPERATURE: 97.6 F | HEART RATE: 78 BPM | OXYGEN SATURATION: 99 % | RESPIRATION RATE: 18 BRPM | BODY MASS INDEX: 33.23 KG/M2 | SYSTOLIC BLOOD PRESSURE: 120 MMHG | WEIGHT: 176 LBS | HEIGHT: 61 IN | DIASTOLIC BLOOD PRESSURE: 81 MMHG

## 2025-06-17 DIAGNOSIS — R07.89 ATYPICAL CHEST PAIN: ICD-10-CM

## 2025-06-17 DIAGNOSIS — M54.6 ACUTE MIDLINE THORACIC BACK PAIN: ICD-10-CM

## 2025-06-17 LAB
ANION GAP SERPL CALCULATED.3IONS-SCNC: 10 MMOL/L (ref 7–15)
ATRIAL RATE - MUSE: 94 BPM
BASOPHILS # BLD AUTO: 0 10E3/UL (ref 0–0.2)
BASOPHILS NFR BLD AUTO: 1 %
BUN SERPL-MCNC: 16.8 MG/DL (ref 8–23)
CALCIUM SERPL-MCNC: 9.9 MG/DL (ref 8.8–10.4)
CHLORIDE SERPL-SCNC: 108 MMOL/L (ref 98–107)
CREAT SERPL-MCNC: 0.62 MG/DL (ref 0.51–0.95)
D DIMER PPP FEU-MCNC: 0.3 UG/ML FEU (ref 0–0.5)
DIASTOLIC BLOOD PRESSURE - MUSE: NORMAL MMHG
EGFRCR SERPLBLD CKD-EPI 2021: >90 ML/MIN/1.73M2
EOSINOPHIL # BLD AUTO: 0.1 10E3/UL (ref 0–0.7)
EOSINOPHIL NFR BLD AUTO: 2 %
ERYTHROCYTE [DISTWIDTH] IN BLOOD BY AUTOMATED COUNT: 15.8 % (ref 10–15)
GLUCOSE SERPL-MCNC: 102 MG/DL (ref 70–99)
HCO3 SERPL-SCNC: 24 MMOL/L (ref 22–29)
HCT VFR BLD AUTO: 41.4 % (ref 35–47)
HGB BLD-MCNC: 12.8 G/DL (ref 11.7–15.7)
HOLD SPECIMEN: NORMAL
IMM GRANULOCYTES # BLD: 0 10E3/UL
IMM GRANULOCYTES NFR BLD: 0 %
INTERPRETATION ECG - MUSE: NORMAL
LYMPHOCYTES # BLD AUTO: 1.5 10E3/UL (ref 0.8–5.3)
LYMPHOCYTES NFR BLD AUTO: 42 %
MCH RBC QN AUTO: 28.1 PG (ref 26.5–33)
MCHC RBC AUTO-ENTMCNC: 30.9 G/DL (ref 31.5–36.5)
MCV RBC AUTO: 91 FL (ref 78–100)
MONOCYTES # BLD AUTO: 0.5 10E3/UL (ref 0–1.3)
MONOCYTES NFR BLD AUTO: 13 %
NEUTROPHILS # BLD AUTO: 1.5 10E3/UL (ref 1.6–8.3)
NEUTROPHILS NFR BLD AUTO: 43 %
NRBC # BLD AUTO: 0 10E3/UL
NRBC BLD AUTO-RTO: 0 /100
P AXIS - MUSE: 46 DEGREES
PLATELET # BLD AUTO: 193 10E3/UL (ref 150–450)
POTASSIUM SERPL-SCNC: 4.1 MMOL/L (ref 3.4–5.3)
PR INTERVAL - MUSE: 182 MS
QRS DURATION - MUSE: 102 MS
QT - MUSE: 382 MS
QTC - MUSE: 477 MS
R AXIS - MUSE: 27 DEGREES
RBC # BLD AUTO: 4.56 10E6/UL (ref 3.8–5.2)
SODIUM SERPL-SCNC: 142 MMOL/L (ref 135–145)
SYSTOLIC BLOOD PRESSURE - MUSE: NORMAL MMHG
T AXIS - MUSE: 46 DEGREES
TROPONIN T SERPL HS-MCNC: 7 NG/L
TROPONIN T SERPL HS-MCNC: <6 NG/L
VENTRICULAR RATE- MUSE: 94 BPM
WBC # BLD AUTO: 3.6 10E3/UL (ref 4–11)

## 2025-06-17 PROCEDURE — 85004 AUTOMATED DIFF WBC COUNT: CPT | Performed by: EMERGENCY MEDICINE

## 2025-06-17 PROCEDURE — 72072 X-RAY EXAM THORAC SPINE 3VWS: CPT

## 2025-06-17 PROCEDURE — 71046 X-RAY EXAM CHEST 2 VIEWS: CPT

## 2025-06-17 PROCEDURE — 85379 FIBRIN DEGRADATION QUANT: CPT | Performed by: EMERGENCY MEDICINE

## 2025-06-17 PROCEDURE — 99285 EMERGENCY DEPT VISIT HI MDM: CPT | Mod: 25

## 2025-06-17 PROCEDURE — 96374 THER/PROPH/DIAG INJ IV PUSH: CPT

## 2025-06-17 PROCEDURE — 80048 BASIC METABOLIC PNL TOTAL CA: CPT | Performed by: EMERGENCY MEDICINE

## 2025-06-17 PROCEDURE — 250N000011 HC RX IP 250 OP 636: Mod: JZ | Performed by: EMERGENCY MEDICINE

## 2025-06-17 PROCEDURE — 36415 COLL VENOUS BLD VENIPUNCTURE: CPT | Performed by: EMERGENCY MEDICINE

## 2025-06-17 PROCEDURE — 93005 ELECTROCARDIOGRAM TRACING: CPT

## 2025-06-17 PROCEDURE — 84484 ASSAY OF TROPONIN QUANT: CPT | Performed by: EMERGENCY MEDICINE

## 2025-06-17 PROCEDURE — 250N000013 HC RX MED GY IP 250 OP 250 PS 637: Performed by: EMERGENCY MEDICINE

## 2025-06-17 RX ORDER — HYDROCODONE BITARTRATE AND ACETAMINOPHEN 5; 325 MG/1; MG/1
1 TABLET ORAL ONCE
Refills: 0 | Status: COMPLETED | OUTPATIENT
Start: 2025-06-17 | End: 2025-06-17

## 2025-06-17 RX ORDER — METHOCARBAMOL 500 MG/1
500 TABLET, FILM COATED ORAL ONCE
Status: COMPLETED | OUTPATIENT
Start: 2025-06-17 | End: 2025-06-17

## 2025-06-17 RX ORDER — HYDROCODONE BITARTRATE AND ACETAMINOPHEN 5; 325 MG/1; MG/1
1 TABLET ORAL EVERY 6 HOURS PRN
Qty: 10 TABLET | Refills: 0 | Status: SHIPPED | OUTPATIENT
Start: 2025-06-17 | End: 2025-06-20

## 2025-06-17 RX ORDER — KETOROLAC TROMETHAMINE 15 MG/ML
15 INJECTION, SOLUTION INTRAMUSCULAR; INTRAVENOUS ONCE
Status: COMPLETED | OUTPATIENT
Start: 2025-06-17 | End: 2025-06-17

## 2025-06-17 RX ORDER — METHOCARBAMOL 500 MG/1
500 TABLET, FILM COATED ORAL 3 TIMES DAILY
Qty: 20 TABLET | Refills: 0 | Status: SHIPPED | OUTPATIENT
Start: 2025-06-17

## 2025-06-17 RX ADMIN — METHOCARBAMOL 500 MG: 500 TABLET ORAL at 12:47

## 2025-06-17 RX ADMIN — KETOROLAC TROMETHAMINE 15 MG: 15 INJECTION, SOLUTION INTRAMUSCULAR; INTRAVENOUS at 09:28

## 2025-06-17 RX ADMIN — HYDROCODONE BITARTRATE AND ACETAMINOPHEN 1 TABLET: 5; 325 TABLET ORAL at 12:47

## 2025-06-17 ASSESSMENT — ACTIVITIES OF DAILY LIVING (ADL)
ADLS_ACUITY_SCORE: 43

## 2025-06-17 ASSESSMENT — COLUMBIA-SUICIDE SEVERITY RATING SCALE - C-SSRS
2. HAVE YOU ACTUALLY HAD ANY THOUGHTS OF KILLING YOURSELF IN THE PAST MONTH?: NO
6. HAVE YOU EVER DONE ANYTHING, STARTED TO DO ANYTHING, OR PREPARED TO DO ANYTHING TO END YOUR LIFE?: NO
1. IN THE PAST MONTH, HAVE YOU WISHED YOU WERE DEAD OR WISHED YOU COULD GO TO SLEEP AND NOT WAKE UP?: NO

## 2025-06-17 NOTE — ED TRIAGE NOTES
Pt presents with chest pain starting at 0730 this morning. Pt reports pain is worse with cough and movement. Pt reports pain wraps around chest into back.     Triage Assessment (Adult)       Row Name 06/17/25 0847          Triage Assessment    Airway WDL WDL        Respiratory WDL    Respiratory WDL X;cough  Pain with cough        Skin Circulation/Temperature WDL    Skin Circulation/Temperature WDL WDL        Peripheral/Neurovascular WDL    Peripheral Neurovascular WDL WDL

## 2025-06-17 NOTE — DISCHARGE INSTRUCTIONS
We have done cardiac tests and chest x-ray and back imaging.  No clear cause for pain has been identified.  Use pain medication when needed.  Return to the emergency room with severe increase in shortness of breath or pain not tolerable with medication.  Please follow-up with your regular doctor to reassess back pain.  Thanks for your patience today.

## 2025-06-17 NOTE — ED PROVIDER NOTES
Emergency Department Note      History of Present Illness     Chief Complaint   Chest Pain      HPI   Esmer Churchill is a 64 year old female with a past medical history significant for cerebral infarction, HTN, and lumbar radiculopathy who presents to the emergency department for evaluation of chest pain. She reports that at about 0730 this morning, she experienced the sudden onset of chest tightness that woke her up from her sleep. She localizes this chest pain to her upper chest, that is also radiating to her upper back. This pain is exacerbated with movement, coughing, forward flexion, and deep inspiration, although she is now able to breathe deeply at bedside without any pain. She does not experience worsening pain with exertion. She has had previous episodes of these symptoms. She is also experiencing mild shortness of breath, and used an albuterol inhaler earlier this morning, with minimal relief. She has been taking Tylenol in order to manage her symptoms, as well as taking a muscle relaxant, tizanidine, which she has been prescribed for chronic lumbar radiculopathy However, she notes that this back pain today feels different in nature compared to her chronic lower back pain. She does have trace bilateral lower extremity edema but notes that this is chronic in nature. She denies blood thinner usage or history of thrombus.     Independent Historian   None    Review of External Notes   I reviewed the orthopedics note from 6/10/25, for which the patient was seen for evaluation of lumbar radiculopathy.    Past Medical History     Medical History and Problem List   Past Medical History:   Diagnosis Date    Cerebral infarction (H)     GERD (gastroesophageal reflux disease)     Hypertension     Impaired glucose tolerance     Spinal stenosis      Patient Active Problem List   Diagnosis    Bursitis, hip    Lower extremity weakness    Weakness of both legs     Medications   HYDROcodone-acetaminophen (NORCO) 5-325 MG  "tablet  methocarbamol (ROBAXIN) 500 MG tablet  albuterol (PROAIR HFA/PROVENTIL HFA/VENTOLIN HFA) 108 (90 BASE) MCG/ACT Inhaler  calcium carbonate (TUMS) 500 MG chewable tablet  hydrochlorothiazide 12.5 MG TABS tablet  lisinopril (PRINIVIL/ZESTRIL) 40 MG tablet  multivitamin, therapeutic with minerals (THERA-VIT-M) TABS  omeprazole (PRILOSEC) 20 MG CR capsule  order for DME  oxybutynin chloride 15 MG TB24  ranitidine (ZANTAC) 75 MG tablet        Surgical History   Past Surgical History:   Procedure Laterality Date    GYN SURGERY          kidney stone surgery         Physical Exam     Patient Vitals for the past 24 hrs:   BP Temp Temp src Pulse Resp SpO2 Height Weight   25 0845 108/73 97.6  F (36.4  C) Oral 95 18 99 % 1.549 m (5' 1\") 79.8 kg (176 lb)     Physical Exam  HENT:      Head: Normocephalic.   Cardiovascular:      Rate and Rhythm: Normal rate and regular rhythm.      Heart sounds: Normal heart sounds.   Pulmonary:      Effort: Pulmonary effort is normal.      Breath sounds: Normal breath sounds.   Abdominal:      General: Bowel sounds are normal.      Palpations: Abdomen is soft.      Comments: Pain localizes to the mid thoracic spine.  Tender with palpation.   Skin:     General: Skin is warm.      Capillary Refill: Capillary refill takes less than 2 seconds.   Neurological:      General: No focal deficit present.      Mental Status: She is alert.   Psychiatric:         Mood and Affect: Mood normal.           Diagnostics     Lab Results   Labs Ordered and Resulted from Time of ED Arrival to Time of ED Departure   BASIC METABOLIC PANEL - Abnormal       Result Value    Sodium 142      Potassium 4.1      Chloride 108 (*)     Carbon Dioxide (CO2) 24      Anion Gap 10      Urea Nitrogen 16.8      Creatinine 0.62      GFR Estimate >90      Calcium 9.9      Glucose 102 (*)    CBC WITH PLATELETS AND DIFFERENTIAL - Abnormal    WBC Count 3.6 (*)     RBC Count 4.56      Hemoglobin 12.8      Hematocrit " 41.4      MCV 91      MCH 28.1      MCHC 30.9 (*)     RDW 15.8 (*)     Platelet Count 193      % Neutrophils 43      % Lymphocytes 42      % Monocytes 13      % Eosinophils 2      % Basophils 1      % Immature Granulocytes 0      NRBCs per 100 WBC 0      Absolute Neutrophils 1.5 (*)     Absolute Lymphocytes 1.5      Absolute Monocytes 0.5      Absolute Eosinophils 0.1      Absolute Basophils 0.0      Absolute Immature Granulocytes 0.0      Absolute NRBCs 0.0     TROPONIN T, HIGH SENSITIVITY - Normal    Troponin T, High Sensitivity 7     D DIMER QUANTITATIVE - Normal    D-Dimer Quantitative 0.30     TROPONIN T, HIGH SENSITIVITY - Normal    Troponin T, High Sensitivity <6         Imaging   Chest XR,  PA & LAT   Final Result   IMPRESSION: Negative chest.      Thoracic spine XR, 3 views   Final Result   IMPRESSION: No fracture. Normal vertebral heights and alignment. Mild to moderate multilevel midthoracic predominant degenerative disc disease with loss of disc height and marginal osteophytic spurring about the disc spaces. Straightening of the normal    thoracic kyphosis. Mild degenerative coronal scoliotic deformity. The visualized lungs are clear.          EKG   ECG results from 06/17/25   EKG 12-lead, tracing only     Value    Systolic Blood Pressure     Diastolic Blood Pressure     Ventricular Rate 94    Atrial Rate 94    DE Interval 182    QRS Duration 102        QTc 477    P Axis 46    R AXIS 27    T Axis 46    Interpretation ECG      Sinus rhythm  Possible Left atrial enlargement  Borderline ECG  When compared with ECG of 18-Dec-2024 21:17,  No significant change was found  Confirmed by GENERATED REPORT, COMPUTER ([id number]),  Amie Durbin ([id number]) on 6/17/2025 12:41:43 PM       Independent Interpretation   CXR shows no ptx, normal cardiac silhouette  Thoracic spine XR shows no fx    ED Course      Medications Administered   Medications   ketorolac (TORADOL) injection 15 mg (15 mg Intravenous $Given  6/17/25 0928)   methocarbamol (ROBAXIN) tablet 500 mg (500 mg Oral $Given 6/17/25 1247)   HYDROcodone-acetaminophen (NORCO) 5-325 MG per tablet 1 tablet (1 tablet Oral $Given 6/17/25 1247)       Procedures   Procedures     Discussion of Management   None    ED Course   ED Course as of 06/17/25 1347   Tue Jun 17, 2025   0917 I obtained history and examined the patient as noted above.     1241 I rechecked the patient and updated.     1251 I rechecked and updated patient on treatment plan. Discussed plan for discharge home and patient is agreeable to plan.       Additional Documentation  None    Medical Decision Making / Diagnosis     CMS Diagnoses: None    MIPS   None               MDM   Semer LUANN Churchill is a 64 year old female presents with midthoracic back pain as well as chest pain.  Patient is well-appearing.  EKG is normal without signs of ischemia.  Troponins were followed x 2 and negative.  Causes of her back pain are highly suggestive of musculoskeletal pain.  Seems worse with motion worse with palpation did consider PE due to chest cage and chest pain low risk for this at 64 D-dimer negative.  Patient did consider aortic dissection.  Aortic root is normal without signs of mediastinal widening.  D-dimer is negative and back pain is reproducible with palpation of the back.  Other lab work is normal.  Care was discussed with the patient recommend pain control follow-up with primary care no concerns for primary ischemic event.  Highly likely musculoskeletal pain and was discharged in stable condition.    Disposition   The patient was discharged.     Diagnosis     ICD-10-CM    1. Atypical chest pain  R07.89       2. Acute midline thoracic back pain  M54.6            Discharge Medications   New Prescriptions    HYDROCODONE-ACETAMINOPHEN (NORCO) 5-325 MG TABLET    Take 1 tablet by mouth every 6 hours as needed for moderate pain.    METHOCARBAMOL (ROBAXIN) 500 MG TABLET    Take 1 tablet (500 mg) by mouth 3 times daily.          Scribe Disclosure:  I, Tricia Horn, am serving as a scribe at 9:19 AM on 6/17/2025 to document services personally performed by Terrance Lopez MD based on my observations and the provider's statements to me.        Terrance Lopez MD  06/17/25 [id number]